# Patient Record
Sex: FEMALE | Race: OTHER | HISPANIC OR LATINO
[De-identification: names, ages, dates, MRNs, and addresses within clinical notes are randomized per-mention and may not be internally consistent; named-entity substitution may affect disease eponyms.]

---

## 2018-12-12 ENCOUNTER — ASOB RESULT (OUTPATIENT)
Age: 28
End: 2018-12-12

## 2018-12-12 ENCOUNTER — APPOINTMENT (OUTPATIENT)
Dept: ANTEPARTUM | Facility: CLINIC | Age: 28
End: 2018-12-12
Payer: MEDICAID

## 2018-12-12 PROBLEM — Z00.00 ENCOUNTER FOR PREVENTIVE HEALTH EXAMINATION: Status: ACTIVE | Noted: 2018-12-12

## 2018-12-12 PROCEDURE — 76801 OB US < 14 WKS SINGLE FETUS: CPT

## 2019-02-19 ENCOUNTER — ASOB RESULT (OUTPATIENT)
Age: 29
End: 2019-02-19

## 2019-02-19 ENCOUNTER — APPOINTMENT (OUTPATIENT)
Dept: ANTEPARTUM | Facility: CLINIC | Age: 29
End: 2019-02-19
Payer: MEDICAID

## 2019-02-19 PROCEDURE — 76817 TRANSVAGINAL US OBSTETRIC: CPT

## 2019-02-19 PROCEDURE — 76811 OB US DETAILED SNGL FETUS: CPT

## 2019-04-09 ENCOUNTER — ASOB RESULT (OUTPATIENT)
Age: 29
End: 2019-04-09

## 2019-04-09 ENCOUNTER — APPOINTMENT (OUTPATIENT)
Age: 29
End: 2019-04-09
Payer: MEDICAID

## 2019-04-09 PROCEDURE — 76816 OB US FOLLOW-UP PER FETUS: CPT

## 2019-05-21 ENCOUNTER — APPOINTMENT (OUTPATIENT)
Dept: ANTEPARTUM | Facility: CLINIC | Age: 29
End: 2019-05-21
Payer: MEDICAID

## 2019-05-21 ENCOUNTER — ASOB RESULT (OUTPATIENT)
Age: 29
End: 2019-05-21

## 2019-05-21 PROCEDURE — 76819 FETAL BIOPHYS PROFIL W/O NST: CPT

## 2019-05-21 PROCEDURE — 76816 OB US FOLLOW-UP PER FETUS: CPT

## 2019-06-06 ENCOUNTER — APPOINTMENT (OUTPATIENT)
Dept: MATERNAL FETAL MEDICINE | Facility: CLINIC | Age: 29
End: 2019-06-06

## 2019-06-10 PROBLEM — Z86.39 HISTORY OF HYPOTHYROIDISM: Status: RESOLVED | Noted: 2019-06-10 | Resolved: 2019-06-10

## 2019-06-10 PROBLEM — O99.280 HYPOTHYROID IN PREGNANCY, ANTEPARTUM: Status: ACTIVE | Noted: 2019-06-10

## 2019-06-10 PROBLEM — Z82.49 FAMILY HISTORY OF CARDIOMYOPATHY: Status: ACTIVE | Noted: 2019-06-10

## 2019-06-10 PROBLEM — O99.210 OBESITY IN PREGNANCY: Status: ACTIVE | Noted: 2019-06-10

## 2019-06-10 PROBLEM — Z82.0 FAMILY HISTORY OF ALZHEIMER'S DISEASE: Status: ACTIVE | Noted: 2019-06-10

## 2019-06-10 PROBLEM — Z82.49 FAMILY HISTORY OF HYPERTENSION: Status: ACTIVE | Noted: 2019-06-10

## 2019-06-10 PROBLEM — Z82.3 FAMILY HISTORY OF CEREBROVASCULAR ACCIDENT (CVA): Status: ACTIVE | Noted: 2019-06-10

## 2019-06-10 RX ORDER — MULTIVIT-MIN/FOLIC/VIT K/LYCOP 400-300MCG
28-0.8 TABLET ORAL DAILY
Refills: 0 | Status: ACTIVE | COMMUNITY
Start: 2019-06-10

## 2019-06-10 RX ORDER — LEVOTHYROXINE SODIUM 0.03 MG/1
25 TABLET ORAL DAILY
Refills: 0 | Status: ACTIVE | COMMUNITY
Start: 2019-06-10

## 2019-06-11 ENCOUNTER — APPOINTMENT (OUTPATIENT)
Dept: MATERNAL FETAL MEDICINE | Facility: CLINIC | Age: 29
End: 2019-06-11

## 2019-06-11 ENCOUNTER — APPOINTMENT (OUTPATIENT)
Dept: ANTEPARTUM | Facility: CLINIC | Age: 29
End: 2019-06-11

## 2019-06-11 DIAGNOSIS — O99.280 ENDOCRINE, NUTRITIONAL AND METABOLIC DISEASES COMPLICATING PREGNANCY, UNSPECIFIED TRIMESTER: ICD-10-CM

## 2019-06-11 DIAGNOSIS — Z82.49 FAMILY HISTORY OF ISCHEMIC HEART DISEASE AND OTHER DISEASES OF THE CIRCULATORY SYSTEM: ICD-10-CM

## 2019-06-11 DIAGNOSIS — Z82.0 FAMILY HISTORY OF EPILEPSY AND OTHER DISEASES OF THE NERVOUS SYSTEM: ICD-10-CM

## 2019-06-11 DIAGNOSIS — Z82.3 FAMILY HISTORY OF STROKE: ICD-10-CM

## 2019-06-11 DIAGNOSIS — E03.9 ENDOCRINE, NUTRITIONAL AND METABOLIC DISEASES COMPLICATING PREGNANCY, UNSPECIFIED TRIMESTER: ICD-10-CM

## 2019-06-11 DIAGNOSIS — O99.210 OBESITY COMPLICATING PREGNANCY, UNSPECIFIED TRIMESTER: ICD-10-CM

## 2019-06-11 DIAGNOSIS — Z86.39 PERSONAL HISTORY OF OTHER ENDOCRINE, NUTRITIONAL AND METABOLIC DISEASE: ICD-10-CM

## 2019-06-14 ENCOUNTER — APPOINTMENT (OUTPATIENT)
Dept: ANTEPARTUM | Facility: CLINIC | Age: 29
End: 2019-06-14
Payer: MEDICAID

## 2019-06-14 ENCOUNTER — ASOB RESULT (OUTPATIENT)
Age: 29
End: 2019-06-14

## 2019-06-14 PROCEDURE — 76816 OB US FOLLOW-UP PER FETUS: CPT

## 2019-06-14 PROCEDURE — 76819 FETAL BIOPHYS PROFIL W/O NST: CPT

## 2019-12-27 ENCOUNTER — EMERGENCY (EMERGENCY)
Facility: HOSPITAL | Age: 29
LOS: 1 days | Discharge: HOME | End: 2019-12-27
Attending: STUDENT IN AN ORGANIZED HEALTH CARE EDUCATION/TRAINING PROGRAM
Payer: SELF-PAY

## 2019-12-27 VITALS
TEMPERATURE: 99 F | SYSTOLIC BLOOD PRESSURE: 147 MMHG | OXYGEN SATURATION: 97 % | HEART RATE: 90 BPM | DIASTOLIC BLOOD PRESSURE: 84 MMHG | RESPIRATION RATE: 18 BRPM

## 2019-12-27 VITALS — WEIGHT: 210.1 LBS | HEIGHT: 64 IN

## 2019-12-27 LAB
ALBUMIN SERPL ELPH-MCNC: 3.8 G/DL — SIGNIFICANT CHANGE UP (ref 3.3–5.2)
ALP SERPL-CCNC: 52 U/L — SIGNIFICANT CHANGE UP (ref 40–120)
ALT FLD-CCNC: 8 U/L — SIGNIFICANT CHANGE UP
ANION GAP SERPL CALC-SCNC: 13 MMOL/L — SIGNIFICANT CHANGE UP (ref 5–17)
APPEARANCE UR: ABNORMAL
AST SERPL-CCNC: 16 U/L — SIGNIFICANT CHANGE UP
BACTERIA # UR AUTO: ABNORMAL
BASOPHILS # BLD AUTO: 0.05 K/UL — SIGNIFICANT CHANGE UP (ref 0–0.2)
BASOPHILS NFR BLD AUTO: 0.4 % — SIGNIFICANT CHANGE UP (ref 0–2)
BILIRUB SERPL-MCNC: 0.2 MG/DL — LOW (ref 0.4–2)
BILIRUB UR-MCNC: NEGATIVE — SIGNIFICANT CHANGE UP
BLD GP AB SCN SERPL QL: SIGNIFICANT CHANGE UP
BUN SERPL-MCNC: 7 MG/DL — LOW (ref 8–20)
CALCIUM SERPL-MCNC: 8.9 MG/DL — SIGNIFICANT CHANGE UP (ref 8.6–10.2)
CHLORIDE SERPL-SCNC: 103 MMOL/L — SIGNIFICANT CHANGE UP (ref 98–107)
CO2 SERPL-SCNC: 20 MMOL/L — LOW (ref 22–29)
COLOR SPEC: YELLOW — SIGNIFICANT CHANGE UP
CREAT SERPL-MCNC: 0.44 MG/DL — LOW (ref 0.5–1.3)
DIFF PNL FLD: ABNORMAL
EOSINOPHIL # BLD AUTO: 0.12 K/UL — SIGNIFICANT CHANGE UP (ref 0–0.5)
EOSINOPHIL NFR BLD AUTO: 0.9 % — SIGNIFICANT CHANGE UP (ref 0–6)
EPI CELLS # UR: ABNORMAL
GLUCOSE SERPL-MCNC: 86 MG/DL — SIGNIFICANT CHANGE UP (ref 70–115)
GLUCOSE UR QL: NEGATIVE MG/DL — SIGNIFICANT CHANGE UP
HCG SERPL-ACNC: 8670 MIU/ML — HIGH
HCT VFR BLD CALC: 42.7 % — SIGNIFICANT CHANGE UP (ref 34.5–45)
HGB BLD-MCNC: 13.3 G/DL — SIGNIFICANT CHANGE UP (ref 11.5–15.5)
IMM GRANULOCYTES NFR BLD AUTO: 0.4 % — SIGNIFICANT CHANGE UP (ref 0–1.5)
KETONES UR-MCNC: NEGATIVE — SIGNIFICANT CHANGE UP
LEUKOCYTE ESTERASE UR-ACNC: ABNORMAL
LYMPHOCYTES # BLD AUTO: 2.89 K/UL — SIGNIFICANT CHANGE UP (ref 1–3.3)
LYMPHOCYTES # BLD AUTO: 22.8 % — SIGNIFICANT CHANGE UP (ref 13–44)
MCHC RBC-ENTMCNC: 27.9 PG — SIGNIFICANT CHANGE UP (ref 27–34)
MCHC RBC-ENTMCNC: 31.1 GM/DL — LOW (ref 32–36)
MCV RBC AUTO: 89.5 FL — SIGNIFICANT CHANGE UP (ref 80–100)
MONOCYTES # BLD AUTO: 0.65 K/UL — SIGNIFICANT CHANGE UP (ref 0–0.9)
MONOCYTES NFR BLD AUTO: 5.1 % — SIGNIFICANT CHANGE UP (ref 2–14)
NEUTROPHILS # BLD AUTO: 8.91 K/UL — HIGH (ref 1.8–7.4)
NEUTROPHILS NFR BLD AUTO: 70.4 % — SIGNIFICANT CHANGE UP (ref 43–77)
NITRITE UR-MCNC: NEGATIVE — SIGNIFICANT CHANGE UP
PH UR: 8 — SIGNIFICANT CHANGE UP (ref 5–8)
PLATELET # BLD AUTO: 221 K/UL — SIGNIFICANT CHANGE UP (ref 150–400)
POTASSIUM SERPL-MCNC: 4.6 MMOL/L — SIGNIFICANT CHANGE UP (ref 3.5–5.3)
POTASSIUM SERPL-SCNC: 4.6 MMOL/L — SIGNIFICANT CHANGE UP (ref 3.5–5.3)
PROT SERPL-MCNC: 7.3 G/DL — SIGNIFICANT CHANGE UP (ref 6.6–8.7)
PROT UR-MCNC: 30 MG/DL
RBC # BLD: 4.77 M/UL — SIGNIFICANT CHANGE UP (ref 3.8–5.2)
RBC # FLD: 12.7 % — SIGNIFICANT CHANGE UP (ref 10.3–14.5)
RBC CASTS # UR COMP ASSIST: ABNORMAL /HPF (ref 0–4)
SODIUM SERPL-SCNC: 136 MMOL/L — SIGNIFICANT CHANGE UP (ref 135–145)
SP GR SPEC: 1.01 — SIGNIFICANT CHANGE UP (ref 1.01–1.02)
UROBILINOGEN FLD QL: NEGATIVE MG/DL — SIGNIFICANT CHANGE UP
WBC # BLD: 12.67 K/UL — HIGH (ref 3.8–10.5)
WBC # FLD AUTO: 12.67 K/UL — HIGH (ref 3.8–10.5)
WBC UR QL: SIGNIFICANT CHANGE UP

## 2019-12-27 PROCEDURE — 84702 CHORIONIC GONADOTROPIN TEST: CPT

## 2019-12-27 PROCEDURE — 36415 COLL VENOUS BLD VENIPUNCTURE: CPT

## 2019-12-27 PROCEDURE — 76817 TRANSVAGINAL US OBSTETRIC: CPT

## 2019-12-27 PROCEDURE — 99284 EMERGENCY DEPT VISIT MOD MDM: CPT

## 2019-12-27 PROCEDURE — 86901 BLOOD TYPING SEROLOGIC RH(D): CPT

## 2019-12-27 PROCEDURE — 76801 OB US < 14 WKS SINGLE FETUS: CPT

## 2019-12-27 PROCEDURE — 86900 BLOOD TYPING SEROLOGIC ABO: CPT

## 2019-12-27 PROCEDURE — 76817 TRANSVAGINAL US OBSTETRIC: CPT | Mod: 26

## 2019-12-27 PROCEDURE — 76801 OB US < 14 WKS SINGLE FETUS: CPT | Mod: 26

## 2019-12-27 PROCEDURE — 87086 URINE CULTURE/COLONY COUNT: CPT

## 2019-12-27 PROCEDURE — 81001 URINALYSIS AUTO W/SCOPE: CPT

## 2019-12-27 PROCEDURE — 86850 RBC ANTIBODY SCREEN: CPT

## 2019-12-27 PROCEDURE — 85027 COMPLETE CBC AUTOMATED: CPT

## 2019-12-27 PROCEDURE — 99284 EMERGENCY DEPT VISIT MOD MDM: CPT | Mod: 25

## 2019-12-27 PROCEDURE — 80053 COMPREHEN METABOLIC PANEL: CPT

## 2019-12-27 RX ORDER — ACETAMINOPHEN 500 MG
975 TABLET ORAL ONCE
Refills: 0 | Status: COMPLETED | OUTPATIENT
Start: 2019-12-27 | End: 2019-12-27

## 2019-12-27 RX ADMIN — Medication 975 MILLIGRAM(S): at 13:48

## 2019-12-27 NOTE — ED PROVIDER NOTE - ATTENDING CONTRIBUTION TO CARE
I personally saw the patient with the PA, and completed the key components of the history and physical exam. I then discussed the management plan with the PA.    pt  with vag bleeding and cramping no confirmed IUP for this preg, VSS, benign exam, r/o anemia and ectopic preg vs threatened ab vs ab - labs, tvus

## 2019-12-27 NOTE — ED PROVIDER NOTE - PATIENT PORTAL LINK FT
You can access the FollowMyHealth Patient Portal offered by [facility] by registering at the following website: [email or web address]. By joining [facility]’s FollowMyHealth portal, you will also be able to view your health information using other applications (apps) compatible with our system.

## 2019-12-27 NOTE — ED PROVIDER NOTE - PROGRESS NOTE DETAILS
care signed out to BRISEIDA Macias Results discussed with PT using . Strict ED return precautions discussed. PT does not have a OB/GYN. PT advised to return to ED in 48 hours for repeat HCG.

## 2019-12-27 NOTE — ED PROVIDER NOTE - NS ED ROS FT
No fever/chills, No photophobia/eye pain/changes in vision, No ear pain/sore throat/dysphagia, No chest pain/palpitations, no SOB/cough/wheeze/stridor, No abdominal pain, No N/V/D, +vaginal bleeding, no dysuria/frequency/discharge, No neck/back pain, no rash, no changes in neurological status/function.

## 2019-12-27 NOTE — ED PROVIDER NOTE - OBJECTIVE STATEMENT
This is a 29 year old female with c/o vaginal bleeding x pelvic cramping x 1 day.  She reports is approximately 13 weeks pregnant.  She notes this is her second pregnancy.  First pregnancy alive and well.  She notes has follow up with [facility] clinic this week.  Admits has not had diagnosed IUP, or confirmatory pregnancy blood work.  She notes upon wiping to use restroom today noticed blood and she immediately laid in bed and elevated her feet.  She notes lower pelvic cramping.  She admits bleeding and cramping have subsided today.  She denies any other complaints fevers, chills, body aches, n/v/d or any sick contacts, recent travel or rashes.

## 2019-12-27 NOTE — ED PROVIDER NOTE - NSFOLLOWUPINSTRUCTIONS_ED_ALL_ED_FT
Threatened Miscarriage    A threatened miscarriage is the term for vaginal bleeding during your first 20 weeks of pregnancy but the pregnancy has not ended. If you have vaginal bleeding during this time, your health care provider will do tests to check if you are still pregnant. If the tests show you are still pregnant and the developing baby (fetus) inside your womb (uterus) is still growing, your condition is considered a threatened miscarriage. A threatened miscarriage does not mean your pregnancy will end, but it does increase the risk of losing your pregnancy. It is important to have close follow up with your obstetrician.    SEEK IMMEDIATE MEDICAL CARE IF YOU HAVE ANY OF THE FOLLOWING SYMPTOMS: heavy vaginal bleeding, severe low back or abdominal cramps, fever/chills, or lightheadedness/dizziness.     Please follow up in the Emergency Department in 48 hours for repeat lab  work.

## 2019-12-28 ENCOUNTER — EMERGENCY (EMERGENCY)
Facility: HOSPITAL | Age: 29
LOS: 1 days | Discharge: HOME | End: 2019-12-28
Attending: EMERGENCY MEDICINE
Payer: SELF-PAY

## 2019-12-28 VITALS
RESPIRATION RATE: 20 BRPM | OXYGEN SATURATION: 99 % | HEIGHT: 64 IN | WEIGHT: 210.1 LBS | DIASTOLIC BLOOD PRESSURE: 69 MMHG | SYSTOLIC BLOOD PRESSURE: 107 MMHG | TEMPERATURE: 98 F | HEART RATE: 87 BPM

## 2019-12-28 LAB
BASOPHILS # BLD AUTO: 0.03 K/UL — SIGNIFICANT CHANGE UP (ref 0–0.2)
BASOPHILS NFR BLD AUTO: 0.3 % — SIGNIFICANT CHANGE UP (ref 0–2)
CULTURE RESULTS: NO GROWTH — SIGNIFICANT CHANGE UP
EOSINOPHIL # BLD AUTO: 0.11 K/UL — SIGNIFICANT CHANGE UP (ref 0–0.5)
EOSINOPHIL NFR BLD AUTO: 1 % — SIGNIFICANT CHANGE UP (ref 0–6)
HCG SERPL-ACNC: 5647 MIU/ML — HIGH
HCT VFR BLD CALC: 41.3 % — SIGNIFICANT CHANGE UP (ref 34.5–45)
HGB BLD-MCNC: 12.9 G/DL — SIGNIFICANT CHANGE UP (ref 11.5–15.5)
IMM GRANULOCYTES NFR BLD AUTO: 0.3 % — SIGNIFICANT CHANGE UP (ref 0–1.5)
LYMPHOCYTES # BLD AUTO: 2.79 K/UL — SIGNIFICANT CHANGE UP (ref 1–3.3)
LYMPHOCYTES # BLD AUTO: 25.7 % — SIGNIFICANT CHANGE UP (ref 13–44)
MCHC RBC-ENTMCNC: 28.5 PG — SIGNIFICANT CHANGE UP (ref 27–34)
MCHC RBC-ENTMCNC: 31.2 GM/DL — LOW (ref 32–36)
MCV RBC AUTO: 91.2 FL — SIGNIFICANT CHANGE UP (ref 80–100)
MONOCYTES # BLD AUTO: 0.55 K/UL — SIGNIFICANT CHANGE UP (ref 0–0.9)
MONOCYTES NFR BLD AUTO: 5.1 % — SIGNIFICANT CHANGE UP (ref 2–14)
NEUTROPHILS # BLD AUTO: 7.36 K/UL — SIGNIFICANT CHANGE UP (ref 1.8–7.4)
NEUTROPHILS NFR BLD AUTO: 67.6 % — SIGNIFICANT CHANGE UP (ref 43–77)
PLATELET # BLD AUTO: 239 K/UL — SIGNIFICANT CHANGE UP (ref 150–400)
RBC # BLD: 4.53 M/UL — SIGNIFICANT CHANGE UP (ref 3.8–5.2)
RBC # FLD: 13 % — SIGNIFICANT CHANGE UP (ref 10.3–14.5)
SPECIMEN SOURCE: SIGNIFICANT CHANGE UP
WBC # BLD: 10.87 K/UL — HIGH (ref 3.8–10.5)
WBC # FLD AUTO: 10.87 K/UL — HIGH (ref 3.8–10.5)

## 2019-12-28 PROCEDURE — 76817 TRANSVAGINAL US OBSTETRIC: CPT | Mod: 26

## 2019-12-28 PROCEDURE — 76801 OB US < 14 WKS SINGLE FETUS: CPT

## 2019-12-28 PROCEDURE — 84702 CHORIONIC GONADOTROPIN TEST: CPT

## 2019-12-28 PROCEDURE — 99284 EMERGENCY DEPT VISIT MOD MDM: CPT

## 2019-12-28 PROCEDURE — 36415 COLL VENOUS BLD VENIPUNCTURE: CPT

## 2019-12-28 PROCEDURE — 85027 COMPLETE CBC AUTOMATED: CPT

## 2019-12-28 PROCEDURE — 99284 EMERGENCY DEPT VISIT MOD MDM: CPT | Mod: 25

## 2019-12-28 PROCEDURE — 76801 OB US < 14 WKS SINGLE FETUS: CPT | Mod: 26

## 2019-12-28 PROCEDURE — 76817 TRANSVAGINAL US OBSTETRIC: CPT

## 2019-12-28 PROCEDURE — T1013: CPT

## 2019-12-28 PROCEDURE — 99053 MED SERV 10PM-8AM 24 HR FAC: CPT

## 2019-12-28 RX ORDER — MISOPROSTOL 200 UG/1
4 TABLET ORAL
Qty: 4 | Refills: 0
Start: 2019-12-28 | End: 2019-12-28

## 2019-12-28 RX ORDER — IBUPROFEN 200 MG
1 TABLET ORAL
Qty: 28 | Refills: 0
Start: 2019-12-28 | End: 2020-01-03

## 2019-12-28 NOTE — ED PROVIDER NOTE - ATTENDING CONTRIBUTION TO CARE
seen with acp: well appearing young female with lower pelvic pain and cramping, was seen yesterday dx with 13 week pregnant; on exam, NAD, no icterus, normal heart and lung sounds, abd soft  with mild pelvic ttp; all results and consults reviewed, agree with acp plan of care

## 2019-12-28 NOTE — CONSULT NOTE ADULT - SUBJECTIVE AND OBJECTIVE BOX
A 30yo  LMP 19 presenting with vaginal bleeding. Patient was seen in the ED yesterday with vaginal bleeding and pelvic cramping. Her HCG was 8670 and she had a subchorionic hematoma with gestational sac. She returns today after passing clots and feeling dizzy at that time. Currently she denies dizziness, abdominal pain, N/V or fevers.     PAST MEDICAL & SURGICAL HISTORY: tonsillectomy, hypothyroidism     Allergies: No Known Allergies    POB/GYN Hx: NSVDx1    Vital Signs:  Vital Signs Last 24 Hrs  T(C): 36.9 (28 Dec 2019 06:42), Max: 36.9 (28 Dec 2019 06:42)  T(F): 98.5 (28 Dec 2019 06:42), Max: 98.5 (28 Dec 2019 06:42)  HR: 87 (28 Dec 2019 06:42) (87 - 87)  BP: 107/69 (28 Dec 2019 06:42) (107/69 - 107/69)  RR: 20 (28 Dec 2019 06:42) (20 - 20)  SpO2: 99% (28 Dec 2019 06:42) (99% - 99%)    Physical Exam:  General: NAD  Abdomen: soft, NT  Pelvic: Normal external genitalia, no lesions in vaginal canal, blood clots in vaginal vault, no lesions on cervix, os is closed, no active bleeding from cervical os  Ext: No cyanosis, edema or calf tenderness    Labs:                        13.3   12.67 )-----------( 221      ( 27 Dec 2019 12:52 )             42.7       136  |  103  |  7.0<L>  ----------------------------<  86  4.6   |  20.0<L>  |  0.44<L>    Ca    8.9      27 Dec 2019 12:52    TPro  7.3  /  Alb  3.8  /  TBili  0.2<L>  /  DBili  x   /  AST  16  /  ALT  8   /  AlkPhos  52      HCG Quantitative, Serum: 5647.0 mIU/mL (19 @ 07:50)  HCG Quantitative, Serum: 8670.0 mIU/mL (19 @ 12:52)    Radiology:  < from: US OB <=14 Weeks, First Gestation (.28.19 @ 11:05) >  INTERPRETATION:  CLINICAL INFORMATION: Vaginal bleeding for 2 days. Decreasing beta hCG today (5647), previously 8670 on 2019.    LMP: 2019.    Estimated Gestational Age by LMP: 13 weeks, 1 day.    COMPARISON: 2019.    Endovaginal and transabdominal pelvic sonogram. Color and Spectral Doppler was performed.    FINDINGS:    Uterus: 14.2 x 5.1 x 6.6 cm. Previously seen uterine gestational sac is no longer visualized. Endometrial stripe is thickened and heterogeneous, measuring 1.4 cm. The endocervical canal is distended with heterogeneous material withoutinternal vascularity.    Right ovary: 3.4 x 2.6 x 2.1 cm. Within normal limits. Flow is present.    Left ovary: 3.1 x 2.6 x 3.0 cm. Corpus luteum, measuring 1.8 x 1.6 x 1.7 cm. Flow is present.    Fluid: None.    IMPRESSION:     in progress.    < end of copied text >

## 2019-12-28 NOTE — ED ADULT TRIAGE NOTE - CHIEF COMPLAINT QUOTE
I was here yesterday for vag bleeding they told me if I  continues to bleed to come back I am bleeding a lot. I am 13 weeks preg

## 2019-12-28 NOTE — ED PROVIDER NOTE - PATIENT PORTAL LINK FT
You can access the FollowMyHealth Patient Portal offered by [facility] by registering at the following website: [email or web address]. By joining [facility]’s FollowMyHealth portal, you will also be able to view your health information using other applications (apps) compatible with our system.

## 2019-12-28 NOTE — ED PROVIDER NOTE - NS ED ROS FT
No fever/chills, No photophobia/eye pain/changes in vision, No ear pain/sore throat/dysphagia, No chest pain/palpitations, no SOB/cough/wheeze/stridor, No abdominal pain, No N/V/D, +vaginal bleeding with clots, no dysuria/frequency/discharge, No neck/back pain, no rash, no changes in neurological status/function.

## 2019-12-28 NOTE — CONSULT NOTE ADULT - ATTENDING COMMENTS
Multip with spontaneous ab, endo lining less than 2cm on sono, no active bleeding on speculum exam, hemodynamically stable.     -cytotec 800mcg   -repeat sono in one week  -strong precautions provided  -questions and concerns were answered  patient understands and agrees with plan  ppalos

## 2019-12-28 NOTE — CONSULT NOTE ADULT - ASSESSMENT
A 28yo  LMP 19 presenting with spontaneous .    Patient is hemodynamically stable with no active bleeding.  Repeat US today showing no IUP.   Will d/c home with cytotec and ibuprofen is h/h stable.  Discussed plan with BRISEIDA Spaulding.    D/W Dr. Yadav

## 2019-12-28 NOTE — ED PROVIDER NOTE - PROGRESS NOTE DETAILS
patient declined pelvic exam, prefers to wait for GYN assessement, likely miscarriage, will rpt beta hcg GYN contacted requested rpt CBC and US US + in progress, patient placed in A13 pending GYN evaluation

## 2019-12-28 NOTE — ED ADULT NURSE NOTE - NS ED NURSE LEVEL OF CONSCIOUSNESS ORIENTATION
Oriented - self; Oriented - place; Oriented - time Banner Transposition Flap Text: The defect edges were debeveled with a #15 scalpel blade.  Given the location of the defect and the proximity to free margins a Banner transposition flap was deemed most appropriate.  Using a sterile surgical marker, an appropriate flap drawn around the defect. The area thus outlined was incised deep to adipose tissue with a #15 scalpel blade.  The skin margins were undermined to an appropriate distance in all directions utilizing iris scissors.

## 2019-12-28 NOTE — ED PROVIDER NOTE - OBJECTIVE STATEMENT
This is a 29 year old female with c/o vaginal bleeding x pelvic cramping x 1 day.  She reports is approximately 13 weeks pregnant.  She notes this is her second pregnancy.  First pregnancy alive and well.  She notes has follow up with [facility] clinic this week.  Admits has not had diagnosed IUP, or confirmatory pregnancy blood work.  She notes upon wiping to use restroom today noticed blood and she immediately laid in bed and elevated her feet.  She notes lower pelvic cramping.  She admits bleeding and cramping have subsided today.  She denies any other complaints fevers, chills, body aches, n/v/d or any sick contacts, recent travel or rashes.

## 2020-06-08 PROBLEM — Z00.00 ENCOUNTER FOR PREVENTIVE HEALTH EXAMINATION: Status: ACTIVE | Noted: 2020-06-08

## 2020-06-16 ENCOUNTER — ASOB RESULT (OUTPATIENT)
Age: 30
End: 2020-06-16

## 2020-06-16 ENCOUNTER — APPOINTMENT (OUTPATIENT)
Dept: ANTEPARTUM | Facility: CLINIC | Age: 30
End: 2020-06-16
Payer: MEDICAID

## 2020-06-16 PROCEDURE — 76805 OB US >/= 14 WKS SNGL FETUS: CPT

## 2020-09-23 ENCOUNTER — APPOINTMENT (OUTPATIENT)
Dept: ANTEPARTUM | Facility: CLINIC | Age: 30
End: 2020-09-23

## 2020-09-28 ENCOUNTER — ASOB RESULT (OUTPATIENT)
Age: 30
End: 2020-09-28

## 2020-09-28 ENCOUNTER — APPOINTMENT (OUTPATIENT)
Dept: ANTEPARTUM | Facility: CLINIC | Age: 30
End: 2020-09-28
Payer: MEDICAID

## 2020-09-28 PROCEDURE — 76816 OB US FOLLOW-UP PER FETUS: CPT

## 2020-10-09 ENCOUNTER — OUTPATIENT (OUTPATIENT)
Dept: INPATIENT UNIT | Facility: HOSPITAL | Age: 30
LOS: 1 days | End: 2020-10-09
Payer: COMMERCIAL

## 2020-10-09 VITALS — HEART RATE: 81 BPM | SYSTOLIC BLOOD PRESSURE: 117 MMHG | TEMPERATURE: 98 F | DIASTOLIC BLOOD PRESSURE: 71 MMHG

## 2020-10-09 VITALS — SYSTOLIC BLOOD PRESSURE: 126 MMHG | HEART RATE: 86 BPM | DIASTOLIC BLOOD PRESSURE: 76 MMHG

## 2020-10-09 DIAGNOSIS — O47.03 FALSE LABOR BEFORE 37 COMPLETED WEEKS OF GESTATION, THIRD TRIMESTER: ICD-10-CM

## 2020-10-09 NOTE — OB PROVIDER TRIAGE NOTE - NSHPPHYSICALEXAM_GEN_ALL_CORE
Vital Signs Last 24 Hrs  T(C): 37.3 (09 Oct 2020 21:07), Max: 37.3 (09 Oct 2020 21:07)  T(F): 99.1 (09 Oct 2020 21:07), Max: 99.1 (09 Oct 2020 21:07)  HR: 81 (09 Oct 2020 22:33) (81 - 86)  BP: 117/71 (09 Oct 2020 22:33) (117/71 - 126/76)  RR: 18 (09 Oct 2020 21:07) (18 - 18)    Gen: NAD  Cardio: RRR  Lungs: CTAB   Abdomen: gravid, nontender to palpation  Ext: nontender lower extremities bilaterally   SSE: no gross pooling, NEG NITRAZINE, amnisure pending   SVE: 0/0/-3  Bedside sono: vertex, fundo-posterior placenta, MVP 4.5cm    FHR: baseline 140, moderate variability, +accels, no decels  [place]: irritability

## 2020-10-09 NOTE — OB PROVIDER TRIAGE NOTE - NSOBPROVIDERNOTE_OBGYN_ALL_OB_FT
31 y/o  at 37w0d evaluated for SROM and labor.   - speculum exam unremarkable, no fluid, nitrazine neg, amnisure neg   - bedside sono with appropriate fluid, MVP 4.5cm   - likely not ruptured   - cervical exam unremarkable, unchanged from yesterday, likely not in labor   - FHT Cat I, reactive   - [place] with minimal irritability     Patient will be discharged home with labor and rupture precautions.   Recommended adequate hydration at home.   Gave instructions for when to return to care earlier than expected.   Will follow up in clinic next week.     d/w Dr. Mendez

## 2020-10-09 NOTE — OB PROVIDER TRIAGE NOTE - HISTORY OF PRESENT ILLNESS
29 y/o  at 37w0d presenting after passing mucoid discharge at noon, has not recurred. Denies vaginal bleeding. Reports intermittent contractions for the last 3d, was checked in the clinic yesterday and was closed. +sexual intercourse within the last 48hrs, +vaginal exam within the last 24hrs. +FM  Prenatal course otherwise uncomplicated.     ObGynHx:   G1: 2019, FT, , 7lbs, uncomplicated   GynHx: denies history of abnormal pap smears, fibroids, endometriosis, or ovarian cysts; denies history of STD's   Meds: PNV  Allergies: NKDA   SocHx: denies use of EtOH, illicit trugs, or tobacco during this pregnancy or prior; denies any hx of anxiety or depression; feels safe at home

## 2020-10-22 ENCOUNTER — OUTPATIENT (OUTPATIENT)
Dept: INPATIENT UNIT | Facility: HOSPITAL | Age: 30
LOS: 1 days | End: 2020-10-22
Payer: COMMERCIAL

## 2020-10-22 VITALS
HEART RATE: 85 BPM | DIASTOLIC BLOOD PRESSURE: 74 MMHG | TEMPERATURE: 98 F | RESPIRATION RATE: 18 BRPM | SYSTOLIC BLOOD PRESSURE: 117 MMHG

## 2020-10-22 VITALS
RESPIRATION RATE: 17 BRPM | TEMPERATURE: 98 F | SYSTOLIC BLOOD PRESSURE: 114 MMHG | DIASTOLIC BLOOD PRESSURE: 70 MMHG | HEART RATE: 96 BPM

## 2020-10-22 DIAGNOSIS — Z90.89 ACQUIRED ABSENCE OF OTHER ORGANS: Chronic | ICD-10-CM

## 2020-10-22 DIAGNOSIS — O47.1 FALSE LABOR AT OR AFTER 37 COMPLETED WEEKS OF GESTATION: ICD-10-CM

## 2020-10-22 LAB
ALBUMIN SERPL ELPH-MCNC: 3.1 G/DL — LOW (ref 3.3–5.2)
ALP SERPL-CCNC: 119 U/L — SIGNIFICANT CHANGE UP (ref 40–120)
ALT FLD-CCNC: 6 U/L — SIGNIFICANT CHANGE UP
AMYLASE P1 CFR SERPL: 66 U/L — SIGNIFICANT CHANGE UP (ref 36–128)
ANION GAP SERPL CALC-SCNC: 13 MMOL/L — SIGNIFICANT CHANGE UP (ref 5–17)
APPEARANCE UR: CLEAR — SIGNIFICANT CHANGE UP
AST SERPL-CCNC: 14 U/L — SIGNIFICANT CHANGE UP
BACTERIA # UR AUTO: NEGATIVE — SIGNIFICANT CHANGE UP
BILIRUB SERPL-MCNC: <0.2 MG/DL — LOW (ref 0.4–2)
BILIRUB UR-MCNC: NEGATIVE — SIGNIFICANT CHANGE UP
BUN SERPL-MCNC: 7 MG/DL — LOW (ref 8–20)
CALCIUM SERPL-MCNC: 8.8 MG/DL — SIGNIFICANT CHANGE UP (ref 8.6–10.2)
CHLORIDE SERPL-SCNC: 104 MMOL/L — SIGNIFICANT CHANGE UP (ref 98–107)
CO2 SERPL-SCNC: 19 MMOL/L — LOW (ref 22–29)
COLOR SPEC: YELLOW — SIGNIFICANT CHANGE UP
CREAT SERPL-MCNC: 0.41 MG/DL — LOW (ref 0.5–1.3)
DIFF PNL FLD: NEGATIVE — SIGNIFICANT CHANGE UP
EPI CELLS # UR: SIGNIFICANT CHANGE UP
GLUCOSE SERPL-MCNC: 120 MG/DL — HIGH (ref 70–99)
GLUCOSE UR QL: NEGATIVE MG/DL — SIGNIFICANT CHANGE UP
HCT VFR BLD CALC: 35 % — SIGNIFICANT CHANGE UP (ref 34.5–45)
HGB BLD-MCNC: 11.5 G/DL — SIGNIFICANT CHANGE UP (ref 11.5–15.5)
KETONES UR-MCNC: NEGATIVE — SIGNIFICANT CHANGE UP
LEUKOCYTE ESTERASE UR-ACNC: ABNORMAL
LIDOCAIN IGE QN: 33 U/L — SIGNIFICANT CHANGE UP (ref 22–51)
MCHC RBC-ENTMCNC: 30.2 PG — SIGNIFICANT CHANGE UP (ref 27–34)
MCHC RBC-ENTMCNC: 32.9 GM/DL — SIGNIFICANT CHANGE UP (ref 32–36)
MCV RBC AUTO: 91.9 FL — SIGNIFICANT CHANGE UP (ref 80–100)
NITRITE UR-MCNC: NEGATIVE — SIGNIFICANT CHANGE UP
PH UR: 6 — SIGNIFICANT CHANGE UP (ref 5–8)
PLATELET # BLD AUTO: 126 K/UL — LOW (ref 150–400)
POTASSIUM SERPL-MCNC: 3.9 MMOL/L — SIGNIFICANT CHANGE UP (ref 3.5–5.3)
POTASSIUM SERPL-SCNC: 3.9 MMOL/L — SIGNIFICANT CHANGE UP (ref 3.5–5.3)
PROT SERPL-MCNC: 5.9 G/DL — LOW (ref 6.6–8.7)
PROT UR-MCNC: 15 MG/DL
RBC # BLD: 3.81 M/UL — SIGNIFICANT CHANGE UP (ref 3.8–5.2)
RBC # FLD: 13.4 % — SIGNIFICANT CHANGE UP (ref 10.3–14.5)
RBC CASTS # UR COMP ASSIST: NEGATIVE /HPF — SIGNIFICANT CHANGE UP (ref 0–4)
SODIUM SERPL-SCNC: 136 MMOL/L — SIGNIFICANT CHANGE UP (ref 135–145)
SP GR SPEC: 1.02 — SIGNIFICANT CHANGE UP (ref 1.01–1.02)
UROBILINOGEN FLD QL: NEGATIVE MG/DL — SIGNIFICANT CHANGE UP
WBC # BLD: 9.05 K/UL — SIGNIFICANT CHANGE UP (ref 3.8–10.5)
WBC # FLD AUTO: 9.05 K/UL — SIGNIFICANT CHANGE UP (ref 3.8–10.5)
WBC UR QL: SIGNIFICANT CHANGE UP

## 2020-10-22 PROCEDURE — 82150 ASSAY OF AMYLASE: CPT

## 2020-10-22 PROCEDURE — 83986 ASSAY PH BODY FLUID NOS: CPT

## 2020-10-22 PROCEDURE — 36415 COLL VENOUS BLD VENIPUNCTURE: CPT

## 2020-10-22 PROCEDURE — 81001 URINALYSIS AUTO W/SCOPE: CPT

## 2020-10-22 PROCEDURE — 59025 FETAL NON-STRESS TEST: CPT

## 2020-10-22 PROCEDURE — 85027 COMPLETE CBC AUTOMATED: CPT

## 2020-10-22 PROCEDURE — 83690 ASSAY OF LIPASE: CPT

## 2020-10-22 PROCEDURE — G0463: CPT

## 2020-10-22 PROCEDURE — 80053 COMPREHEN METABOLIC PANEL: CPT

## 2020-10-22 PROCEDURE — 76815 OB US LIMITED FETUS(S): CPT

## 2020-10-22 RX ORDER — SODIUM CHLORIDE 9 MG/ML
1000 INJECTION, SOLUTION INTRAVENOUS ONCE
Refills: 0 | Status: DISCONTINUED | OUTPATIENT
Start: 2020-10-22 | End: 2020-11-06

## 2020-10-22 NOTE — OB RN TRIAGE NOTE - PMH
No pertinent past medical history     <<----- Click to add NO pertinent Past Medical History Miscarriage  x1  Normal delivery at term  2019

## 2020-10-22 NOTE — OB PROVIDER TRIAGE NOTE - HISTORY OF PRESENT ILLNESS
ANTHONY WOOTEN is a 29 year old  at 38 weeks GA who was sent by the clinic 2/2 decreased fetal movement since 2am this morning and nausea/vomiting. She states that on last cervical exam in the clinic she was dilated by 1cm. She reports nausea/vomiting for the past 4-5 days unrelated to food intake. This morning she had three episodes of nonbilious yellow vomit with streaks of blood. She also complains of a headache, white spots in her vision, chest pressure and numbness in both of her hands. She denies diarrhea, constipation, fever and dysuria.  Prenatal course otherwise uncomplicated.    FM: decreased  LOF: no  VB: no Cx: irregular every 3-4 hours    SHAHID: 2020  LMP: 2020      OBHx: , IOL in 2019 (female infant, 6lbs), Chlamydia in previous pregnancy (treated)  PMHx: hypothyroidism (no treatment required)  SGHx: tonsillectomy   Rx: PNV  All: NKA  SOHx: no smoking, alcohol and recreational drug use    Vital Signs Last 24 Hrs:  T(C): 36.9 (22 Oct 2020 13:44), Max: 36.9 (22 Oct 2020 13:44)  T(F): 98.4 (22 Oct 2020 13:44), Max: 98.4 (22 Oct 2020 13:44)  HR: 77 (22 Oct 2020 15:31) (77 - 85)  BP: 108/76 (22 Oct 2020 15:31) (108/76 - 117/74)  RR: 18 (22 Oct 2020 13:44) (18 - 18)    Physical Exam:  Gen: NAD, alert and oriented  Cardio: RRR  Lungs: CTAB   Abdomen: gravid, nontender to palpation  Ext: nontender lower extremities bilaterally   Bedside sono: vertex, fundal placenta, BPP 8/8, LISETTE 11.95    FHR: baseline 145, moderate variability, +accels, no decels  [place]: irregular contractions       ANTHONY WOOTEN is a 29 year old  at 38 weeks GA who was sent by the clinic 2/2 decreased fetal movement since 2am this morning and nausea/vomiting.  She states that on last cervical exam in the clinic she was dilated by 1cm.  She reports nausea/vomiting for the past 4-5 days unrelated to food intake. This morning she had three episodes of nonbilious yellow vomit with streaks of blood. She also complains of a headache, white spots in her vision, chest pressure and numbness in both of her hands.    Denies vaginal bleeding or loss of fluid. Reports mild contractions, every 3-4 hours  She denies fevers, chills, shortness of breath, dysuria, frequency, urgency, changes in bowel movements.     SHAHID: 2020  LMP: 2020    Prenatal course otherwise uncomplicated.    OBHx:  G1 SAB   G2 , IOL in 2019 (female infant, 6lbs), Chlamydia in previous pregnancy (treated)  PMHx: subclinical hypothyroidism   SGHx: tonsillectomy   Rx: PNV  All: NKDA  SOHx: no smoking, alcohol and recreational drug use    Vital Signs Last 24 Hrs:  T(C): 36.9 (22 Oct 2020 13:44), Max: 36.9 (22 Oct 2020 13:44)  T(F): 98.4 (22 Oct 2020 13:44), Max: 98.4 (22 Oct 2020 13:44)  HR: 77 (22 Oct 2020 15:31) (77 - 85)  BP: 108/76 (22 Oct 2020 15:31) (108/76 - 117/74)  RR: 18 (22 Oct 2020 13:44) (18 - 18)    Physical Exam:  Gen: NAD, alert and orientedx3  Cardio: RRR  Lungs: CTAB   Abdomen: gravid, nontender to palpation, no guarding or rebound; negative crawford sign  Ext: nontender lower extremities bilaterally; +2 pulses, no swelling  Bedside sono: vertex, fundal placenta, BPP 8/8, LISETTE 11.95    FHR: baseline 145, moderate variability, +accels, no decels  [place]: irregular contractions  SVE: deferred

## 2020-10-22 NOTE — OB PROVIDER TRIAGE NOTE - NSOBPROVIDERNOTE_OBGYN_ALL_OB_FT
ANTHONY WOOTEN is a 29 year old  at 38 weeks GA who was sent by the clinic 2/2 decreased fetal movement  and nausea/vomiting since 2am this morning.    Plan:    Problem #1: decreased fetal movement  - bedside sono with appropriate fluid, BPP 8/8, fetal movement noted   - reactive FHT  - no other obstetrical complains  - patient advised to hydrate adequately at home  - counseled on     Problem #2: nausea/vomiting  - CBC, CMP, UA and lipase WNL  -     - IVF for dehydration  - ANTHONY WOOTEN is a 29 year old  at 38 weeks GA who was sent by the clinic 2/2 decreased fetal movement and nausea/vomiting since 2am this morning.    Plan:    Problem #1: decreased fetal movement  - bedside sono with appropriate fluid, BPP 8/8, fetal movement noted   - reactive FHT  - no other obstetrical complains  - patient advised to hydrate adequately at home  - counseled on signs and symptoms meriting call to OB provider or return to the hospital    Problem #2: nausea/vomiting  - CBC significant for mild thrombocytopenia  - CMP, UA and lipase WNL  - IVF for dehydration    Problem #3: gestational thrombocytopenia   - platelet count of 126  - to be monitored weekly  - ANTHONY WOOTEN is a 29 year old  at 38 weeks GA who was sent by the clinic 2/2 decreased fetal movement and nausea/vomiting since 2am this morning.    Plan:    Problem #1: decreased fetal movement  - bedside sono with appropriate fluid, BPP 8/8, fetal movement noted   - reactive FHT  - no other obstetrical complains  - patient advised to hydrate adequately at home  - counseled on signs and symptoms meriting call to OB provider or return to the hospital    Problem #2: nausea/vomiting  - CBC significant for mild thrombocytopenia  - CMP, UA and lipase WNL  - IVF for dehydration    Problem #3: gestational thrombocytopenia   - platelet count of 126  - no further evaluation necessary, currently no risk for the mother or fetus  - most likely to resolve postpartum  - to be monitored weekly ANTHONY WOOTEN is a 29 year old  at 38 weeks GA who was sent by the clinic 2/2 decreased fetal movement and nausea/vomiting     Plan:    decreased fetal movement  - bedside u/s LISETTE 11.97, BPP 8/8  - FHT reactive; attending reviewed and approved  - no other obstetrical complains  - s/p LR 1000 bolus  - patient advised to hydrate adequately at home      #nausea/vomiting  - gastritis vs urinary infection  - CBC, CMP, UA and lipase WNL  - s/p LR bolus for dehydration  - able to tolerate PO  - not requiring inpatient management      #gestational thrombocytopenia   - platelet count of 126  - possibly secondary to hemodilution  - no further evaluation necessary, currently no risk for the mother or fetus  - most likely to resolve postpartum  - to be monitored weekly; has follow up appointment in clinic    Dispo:   stable for discharge home  counseled on signs and symptoms meriting call to OB provider or return to the hospital    Discussed with Dr Mendez

## 2020-11-01 ENCOUNTER — INPATIENT (INPATIENT)
Facility: HOSPITAL | Age: 30
LOS: 2 days | Discharge: HOME | End: 2020-11-04
Attending: OBSTETRICS & GYNECOLOGY | Admitting: OBSTETRICS & GYNECOLOGY
Payer: COMMERCIAL

## 2020-11-01 VITALS
DIASTOLIC BLOOD PRESSURE: 70 MMHG | RESPIRATION RATE: 18 BRPM | HEART RATE: 85 BPM | TEMPERATURE: 98 F | SYSTOLIC BLOOD PRESSURE: 113 MMHG

## 2020-11-01 DIAGNOSIS — Z90.89 ACQUIRED ABSENCE OF OTHER ORGANS: Chronic | ICD-10-CM

## 2020-11-01 DIAGNOSIS — O26.893 OTHER SPECIFIED PREGNANCY RELATED CONDITIONS, THIRD TRIMESTER: ICD-10-CM

## 2020-11-01 DIAGNOSIS — O47.1 FALSE LABOR AT OR AFTER 37 COMPLETED WEEKS OF GESTATION: ICD-10-CM

## 2020-11-01 LAB
BASOPHILS # BLD AUTO: 0.03 K/UL — SIGNIFICANT CHANGE UP (ref 0–0.2)
BASOPHILS NFR BLD AUTO: 0.3 % — SIGNIFICANT CHANGE UP (ref 0–2)
BLD GP AB SCN SERPL QL: SIGNIFICANT CHANGE UP
EOSINOPHIL # BLD AUTO: 0.07 K/UL — SIGNIFICANT CHANGE UP (ref 0–0.5)
EOSINOPHIL NFR BLD AUTO: 0.7 % — SIGNIFICANT CHANGE UP (ref 0–6)
HCT VFR BLD CALC: 35.1 % — SIGNIFICANT CHANGE UP (ref 34.5–45)
HGB BLD-MCNC: 11.4 G/DL — LOW (ref 11.5–15.5)
IMM GRANULOCYTES NFR BLD AUTO: 1.2 % — SIGNIFICANT CHANGE UP (ref 0–1.5)
LYMPHOCYTES # BLD AUTO: 2 K/UL — SIGNIFICANT CHANGE UP (ref 1–3.3)
LYMPHOCYTES # BLD AUTO: 21.3 % — SIGNIFICANT CHANGE UP (ref 13–44)
MCHC RBC-ENTMCNC: 29.6 PG — SIGNIFICANT CHANGE UP (ref 27–34)
MCHC RBC-ENTMCNC: 32.5 GM/DL — SIGNIFICANT CHANGE UP (ref 32–36)
MCV RBC AUTO: 91.2 FL — SIGNIFICANT CHANGE UP (ref 80–100)
MONOCYTES # BLD AUTO: 0.75 K/UL — SIGNIFICANT CHANGE UP (ref 0–0.9)
MONOCYTES NFR BLD AUTO: 8 % — SIGNIFICANT CHANGE UP (ref 2–14)
NEUTROPHILS # BLD AUTO: 6.41 K/UL — SIGNIFICANT CHANGE UP (ref 1.8–7.4)
NEUTROPHILS NFR BLD AUTO: 68.5 % — SIGNIFICANT CHANGE UP (ref 43–77)
PLATELET # BLD AUTO: 123 K/UL — LOW (ref 150–400)
RBC # BLD: 3.85 M/UL — SIGNIFICANT CHANGE UP (ref 3.8–5.2)
RBC # FLD: 13.4 % — SIGNIFICANT CHANGE UP (ref 10.3–14.5)
SARS-COV-2 RNA SPEC QL NAA+PROBE: SIGNIFICANT CHANGE UP
WBC # BLD: 9.37 K/UL — SIGNIFICANT CHANGE UP (ref 3.8–10.5)
WBC # FLD AUTO: 9.37 K/UL — SIGNIFICANT CHANGE UP (ref 3.8–10.5)

## 2020-11-01 RX ORDER — CITRIC ACID/SODIUM CITRATE 300-500 MG
30 SOLUTION, ORAL ORAL ONCE
Refills: 0 | Status: COMPLETED | OUTPATIENT
Start: 2020-11-01 | End: 2020-11-02

## 2020-11-01 RX ORDER — ACETAMINOPHEN 500 MG
975 TABLET ORAL ONCE
Refills: 0 | Status: COMPLETED | OUTPATIENT
Start: 2020-11-01 | End: 2020-11-01

## 2020-11-01 RX ORDER — SODIUM CHLORIDE 9 MG/ML
1000 INJECTION, SOLUTION INTRAVENOUS
Refills: 0 | Status: DISCONTINUED | OUTPATIENT
Start: 2020-11-01 | End: 2020-11-02

## 2020-11-01 RX ORDER — OXYTOCIN 10 UNIT/ML
VIAL (ML) INJECTION
Qty: 20 | Refills: 0 | Status: COMPLETED | OUTPATIENT
Start: 2020-11-01 | End: 2020-11-01

## 2020-11-01 RX ORDER — SODIUM CHLORIDE 9 MG/ML
1000 INJECTION, SOLUTION INTRAVENOUS ONCE
Refills: 0 | Status: DISCONTINUED | OUTPATIENT
Start: 2020-11-01 | End: 2020-11-01

## 2020-11-01 RX ADMIN — Medication 975 MILLIGRAM(S): at 17:23

## 2020-11-01 RX ADMIN — Medication 1 TABLET(S): at 21:24

## 2020-11-01 RX ADMIN — Medication 1 TABLET(S): at 20:24

## 2020-11-01 RX ADMIN — Medication 975 MILLIGRAM(S): at 16:23

## 2020-11-01 RX ADMIN — SODIUM CHLORIDE 125 MILLILITER(S): 9 INJECTION, SOLUTION INTRAVENOUS at 13:00

## 2020-11-01 NOTE — OB PROVIDER H&P - HISTORY OF PRESENT ILLNESS
ANTHONY WOOTEN is a 29 year old  at 39w 6d presenting with a headache since 11pm that did not improve after a single dose of 325mg of Tylenol. Denies vaginal bleeding, leakage of fluid or uterine contractions. Reports active fetal movement.     SHAHID: 2020  LMP: 2020    Prenatal course otherwise uncomplicated.    OBHx:  G1 SAB   G2 , IOL in 2019 (female infant, 6lbs), Chlamydia in previous pregnancy (treated)  PMHx: subclinical hypothyroidism (euthyroid after testing without medications)   SGHx: tonsillectomy   Rx: PNV  All: NKDA

## 2020-11-01 NOTE — OB PROVIDER H&P - ATTENDING COMMENTS
29 year old  at 39w 6d admitted for IOL for NRFHT at term   denies VB , LOF. good FM  GBS neg   hx of GBS in prev pregnancy   euthyroid , not on meds  EFW 7lbs   plan start IOL   DVT ppx

## 2020-11-01 NOTE — OB PROVIDER H&P - ASSESSMENT
ANTHONY WOOTEN is a 29 year old  at 39w 6d admitted for IOL for NRFHT    - Consent at bedside  - IV Fluids  - GBS negative   - Rh positive  - Cytotec for IOL   - Non-reactive FHT without uterine contractions

## 2020-11-01 NOTE — OB PROVIDER H&P - ALERT: PERTINENT HISTORY
1st Trimester Sonogram/Follow up Sonogram for Growth/Fetal Non-Stress Test (NST)/Ultra Screen at 12 Weeks

## 2020-11-01 NOTE — OB PROVIDER H&P - NSHPPHYSICALEXAM_GEN_ALL_CORE
Vital Signs Last 24 Hrs  T(C): 36.8 (01 Nov 2020 12:47), Max: 36.8 (01 Nov 2020 11:49)  T(F): 98.2 (01 Nov 2020 12:47), Max: 98.2 (01 Nov 2020 11:49)  HR: 85 (01 Nov 2020 12:47) (85 - 85)  BP: 113/70 (01 Nov 2020 12:47) (113/70 - 113/70)  RR: 18 (01 Nov 2020 12:47) (18 - 18)    General: NAD   Abdomen: soft, NT, gravid uterus  SVE: 1/50/-3, intact, vertex     Bedside: BPP 6/8 (no fetal breathing); LISETTE 14.6 cm, vertex, posterior placenta, active fetal movement

## 2020-11-02 ENCOUNTER — TRANSCRIPTION ENCOUNTER (OUTPATIENT)
Age: 30
End: 2020-11-02

## 2020-11-02 LAB
SARS-COV-2 IGG SERPL QL IA: POSITIVE
SARS-COV-2 IGM SERPL IA-ACNC: 110 INDEX — HIGH

## 2020-11-02 PROCEDURE — G0463: CPT

## 2020-11-02 PROCEDURE — 84112 EVAL AMNIOTIC FLUID PROTEIN: CPT

## 2020-11-02 PROCEDURE — 59050 FETAL MONITOR W/REPORT: CPT

## 2020-11-02 PROCEDURE — 83986 ASSAY PH BODY FLUID NOS: CPT

## 2020-11-02 PROCEDURE — 59025 FETAL NON-STRESS TEST: CPT

## 2020-11-02 RX ORDER — KETOROLAC TROMETHAMINE 30 MG/ML
30 SYRINGE (ML) INJECTION ONCE
Refills: 0 | Status: DISCONTINUED | OUTPATIENT
Start: 2020-11-02 | End: 2020-11-02

## 2020-11-02 RX ORDER — OXYCODONE HYDROCHLORIDE 5 MG/1
5 TABLET ORAL ONCE
Refills: 0 | Status: DISCONTINUED | OUTPATIENT
Start: 2020-11-02 | End: 2020-11-04

## 2020-11-02 RX ORDER — IBUPROFEN 200 MG
600 TABLET ORAL EVERY 6 HOURS
Refills: 0 | Status: COMPLETED | OUTPATIENT
Start: 2020-11-02 | End: 2021-10-01

## 2020-11-02 RX ORDER — IBUPROFEN 200 MG
1 TABLET ORAL
Qty: 28 | Refills: 0
Start: 2020-11-02 | End: 2020-11-08

## 2020-11-02 RX ORDER — ACETAMINOPHEN 500 MG
975 TABLET ORAL
Refills: 0 | Status: DISCONTINUED | OUTPATIENT
Start: 2020-11-02 | End: 2020-11-04

## 2020-11-02 RX ORDER — OXYTOCIN 10 UNIT/ML
2 VIAL (ML) INJECTION
Qty: 30 | Refills: 0 | Status: DISCONTINUED | OUTPATIENT
Start: 2020-11-02 | End: 2020-11-02

## 2020-11-02 RX ORDER — IBUPROFEN 200 MG
600 TABLET ORAL EVERY 6 HOURS
Refills: 0 | Status: DISCONTINUED | OUTPATIENT
Start: 2020-11-02 | End: 2020-11-04

## 2020-11-02 RX ORDER — OXYCODONE HYDROCHLORIDE 5 MG/1
5 TABLET ORAL
Refills: 0 | Status: DISCONTINUED | OUTPATIENT
Start: 2020-11-02 | End: 2020-11-04

## 2020-11-02 RX ADMIN — Medication 2 MILLIUNIT(S)/MIN: at 08:55

## 2020-11-02 RX ADMIN — Medication 975 MILLIGRAM(S): at 22:05

## 2020-11-02 RX ADMIN — Medication 975 MILLIGRAM(S): at 20:37

## 2020-11-02 RX ADMIN — Medication 30 MILLILITER(S): at 06:02

## 2020-11-02 RX ADMIN — Medication 1000 MILLIUNIT(S)/MIN: at 12:26

## 2020-11-02 RX ADMIN — Medication 30 MILLIGRAM(S): at 13:14

## 2020-11-02 RX ADMIN — Medication 30 MILLIGRAM(S): at 13:29

## 2020-11-02 RX ADMIN — Medication 600 MILLIGRAM(S): at 18:06

## 2020-11-02 RX ADMIN — Medication 600 MILLIGRAM(S): at 23:32

## 2020-11-02 RX ADMIN — Medication 600 MILLIGRAM(S): at 18:25

## 2020-11-02 NOTE — DISCHARGE NOTE OB - PLAN OF CARE
Rapid recovery Patient should transition to regular activity level. Resume regular diet. Patient should follow up with her OB for a postpartum checkup 3 weeks after delivery. Patient should call her doctor sooner if she develops a fever or uncontrolled vaginal bleeding or fevers. Please call sooner if there are any other concerns.

## 2020-11-02 NOTE — DISCHARGE NOTE OB - HOSPITAL COURSE
She is a 31 yo now  who presented at 40wks GA for induction of labor and had a normal delivery. She had a normal postpartum course and was discharged home in stable condition on postpartum day 1.  hard copy, drawn during this pregnancy

## 2020-11-02 NOTE — OB PROVIDER DELIVERY SUMMARY - NSPROVIDERDELIVERYNOTE_OBGYN_ALL_OB_FT
Patient felt rectal pressure and was found to be fully dilated, 0 station. She pushed effectively for 20 minutes, and delivered a viable male infant at 1226. Vertex delivered without difficulty, no nuchal cord noted, compound presentation was noted with right hand at perneum, both shoulders then delivered without difficulty.  Delayed cord clamping for approximately 30 seconds, and skin to skin was initiated. Cord segment was clamped and cut for cord blood and gas collection. Placenta delivered spontaneously and intact at 1232. Pitocin started. Uterus, cervix, perineum and vagina were inspected and no lacerations were noted. Apgars 9,9. EBL 100cc. Patient felt rectal pressure and was found to be fully dilated, 0 station. She pushed effectively for 20 minutes, and delivered a viable male infant at 1226. Vertex delivered without difficulty, no nuchal cord noted, compound presentation was noted with right hand at perineum, both shoulders then delivered without difficulty.  Delayed cord clamping for approximately 30 seconds, and skin to skin was initiated. Cord segment was clamped and cut for cord blood and gas collection. Placenta delivered spontaneously and intact at 1232. Pitocin started. Uterus, cervix, perineum and vagina were inspected and no lacerations were noted. Apgars 9,9. EBL 100cc.

## 2020-11-02 NOTE — DISCHARGE NOTE OB - CARE PLAN
Principal Discharge DX:	Normal delivery at term  Goal:	Rapid recovery  Assessment and plan of treatment:	Patient should transition to regular activity level. Resume regular diet. Patient should follow up with her OB for a postpartum checkup 3 weeks after delivery. Patient should call her doctor sooner if she develops a fever or uncontrolled vaginal bleeding or fevers. Please call sooner if there are any other concerns.

## 2020-11-02 NOTE — DISCHARGE NOTE OB - CARE PROVIDER_API CALL
[facility],   [address] [address]  Phone: [phone]  Fax: (   )    -  Follow Up Time:

## 2020-11-02 NOTE — OB RN DELIVERY SUMMARY - NS_SEPSISRSKCALC_OBGYN_ALL_OB_FT
GBS status in the 'Prenatal Lab tests/results section' on the OB RN Patient Profile must be documented.   EOS calculated successfully. EOS Risk Factor: 0.5/1000 live births ([facility] national incidence); GA=40w;Temp=98.6; ROM=5.1; GBS='Negative'; Antibiotics='No antibiotics or any antibiotics < 2 hrs prior to birth'

## 2020-11-02 NOTE — DISCHARGE NOTE OB - MEDICATION SUMMARY - MEDICATIONS TO TAKE
I will START or STAY ON the medications listed below when I get home from the hospital:    ibuprofen 600 mg oral tablet  -- 1 tab(s) by mouth every 6 hours   -- Do not take this drug if you are pregnant.  It is very important that you take or use this exactly as directed.  Do not skip doses or discontinue unless directed by your doctor.  May cause drowsiness or dizziness.  Obtain medical advice before taking any non-prescription drugs as some may affect the action of this medication.  Take with food or milk.    -- Indication: For pain

## 2020-11-02 NOTE — OB PROVIDER LABOR PROGRESS NOTE - ASSESSMENT
30 y.o.  at 40 weeks gestation undergoing IOL for NRFHT, s/p cytotec. Cat 1 FHT. AROM with clear fluid.     - continue expectant management  - if contractions space out, consider pitocin  - continuous toco and fetal heart monitoring      Discussed with Dr. Mendez.
-continues with headache with migraine-like picture; refractory to tylenol 975  -to receive one dose of fioricet   -continues to be normotensive  -FHR Cat I  -continue cytotec, due for first dose of 40 at 20:00  -contractions not requesting pain management at this time  -continue to monitor
FHT cat 1. Not feeling contractions and not yifan regularly. Headache improved. Continue cytotec IOL
-progressing in labor  -counseled on risks/benefits/timing of epidural; at this time, patient would prefer to wait for epidural at later time; not requesting other forms of pain management  -Cat I tracing  -continue cytotec; due for 2nd dose of 60 at approx 0330am    discussed with Dr Hoff
30 y.o.  at 40 weeks undergoing IOL for NRFHT on pitocin, s/p cytotec. Cat 1 FHT. AROM with clear fluid.    -continue induction with pitocin  -continuous toco and fetal heart monitoring  -allow for passive descent before pushing    Discussed with Dr. Mendez
Patient making good cervical change. Will get epidural. T cat 1. Will change to pitocin after.     discussed with Dr. Hoff

## 2020-11-02 NOTE — DISCHARGE NOTE OB - MEDICATION SUMMARY - MEDICATIONS TO STOP TAKING
I will STOP taking the medications listed below when I get home from the hospital:    Cytotec 200 mcg oral tablet  -- 4 tab(s) by mouth once   -- Do not take this drug if you are pregnant.  It is very important that you take or use this exactly as directed.  Do not skip doses or discontinue unless directed by your doctor.  Take with food or milk.

## 2020-11-02 NOTE — OB PROVIDER LABOR PROGRESS NOTE - NS_OBIHIFHRDETAILS_OBGYN_ALL_OB_FT
140/mod elena/+accels/ no decels - periods of minimal variability c/w sleep cycles
baseline 145, moderate variability, no accels, no decels
130 baseline, moderate variability, +accels, -decels
140/mod elena/+accels
baseline 135, moderate variability, +accels, no decels
140 baseline/moderate variability/ +accels/-decels

## 2020-11-02 NOTE — DISCHARGE NOTE OB - MATERIALS PROVIDED
[facility] Screening Program/Breastfeeding Log/Guide to Postpartum Care/[facility] Hearing Screen Program/Back To Sleep Handout/Shaken Baby Prevention Handout/Birth Certificate Instructions/Tdap Vaccination (VIS Pub Date: 2012)

## 2020-11-02 NOTE — DISCHARGE NOTE OB - PATIENT PORTAL LINK FT
You can access the FollowMyHealth Patient Portal offered by [facility] by registering at the following website: [email or web address]. By joining [employer]’s FollowMyHealth portal, you will also be able to view your health information using other applications (apps) compatible with our system.

## 2020-11-02 NOTE — DISCHARGE NOTE OB - PROVIDER TOKENS
FREE:[LAST:[[facility] clini],PHONE:[phone]],FAX:[phone]    -],ADDRESS:[[address] [place] [place] [address]]]

## 2020-11-02 NOTE — OB PROVIDER LABOR PROGRESS NOTE - NS_SUBJECTIVE/OBJECTIVE_OBGYN_ALL_OB_FT
IOL with oral cytotec ( s/p 2nd dose 40 mcg)  for NRFHT. Patient s/p one dose of fioricet from migraine.
Patient feeling more pain and would like an epidural.
Patient is feeling some rectal and vaginal pressure.
Patient re-evaluated because requesting epidural  Rates pain 7/10 when having contractions
Patient re-evaluated due to persistent headache  Rates pain 8/10 now with new blurry vision and photophobia  Denies nausea, vomiting, shortness of breath, RUQ or epigastric pain    Feeling mild pelvic discomfort with contractions
Patient is feeling comfortable with epidural in place.     AROM with clear fluid.

## 2020-11-03 LAB
T PALLIDUM AB TITR SER: NEGATIVE — SIGNIFICANT CHANGE UP

## 2020-11-03 RX ORDER — INFLUENZA VIRUS VACCINE 15; 15; 15; 15 UG/.5ML; UG/.5ML; UG/.5ML; UG/.5ML
0.5 SUSPENSION INTRAMUSCULAR ONCE
Refills: 0 | Status: COMPLETED | OUTPATIENT
Start: 2020-11-03 | End: 2020-11-04

## 2020-11-03 RX ADMIN — Medication 600 MILLIGRAM(S): at 18:00

## 2020-11-03 RX ADMIN — Medication 600 MILLIGRAM(S): at 12:15

## 2020-11-03 RX ADMIN — Medication 975 MILLIGRAM(S): at 20:19

## 2020-11-03 RX ADMIN — Medication 600 MILLIGRAM(S): at 06:02

## 2020-11-03 RX ADMIN — Medication 600 MILLIGRAM(S): at 11:19

## 2020-11-03 RX ADMIN — Medication 600 MILLIGRAM(S): at 06:30

## 2020-11-03 RX ADMIN — Medication 975 MILLIGRAM(S): at 15:50

## 2020-11-03 RX ADMIN — Medication 975 MILLIGRAM(S): at 21:19

## 2020-11-03 RX ADMIN — Medication 975 MILLIGRAM(S): at 05:00

## 2020-11-03 RX ADMIN — Medication 600 MILLIGRAM(S): at 01:16

## 2020-11-03 RX ADMIN — Medication 975 MILLIGRAM(S): at 03:55

## 2020-11-03 RX ADMIN — Medication 975 MILLIGRAM(S): at 14:55

## 2020-11-03 RX ADMIN — Medication 600 MILLIGRAM(S): at 17:09

## 2020-11-03 NOTE — PROGRESS NOTE ADULT - ATTENDING COMMENTS
Post  day # 1  no complaints  exam wnl    Plan  cbc  other routine care  discussed contraception, vaccination, breastfeeding  cleared for dc if cbc normal  postpartum visit in 4 to 6 w

## 2020-11-03 NOTE — PROGRESS NOTE ADULT - ASSESSMENT
30y F, now , postpartum day 1 after  at 40w, IOL for NRFHT.  Pt progressing well, plan for discharge home today. 30y F, now , postpartum day 1 after  at 40w, IOL for NRFHT.  Pt progressing well, plan for discharge home today.     30y F, now , postpartum day 1 after  at 40w, IOL for NRFHT.    - Pt progressing well with uncomplicated postpartum course  - Advance care as tolerated  - Continue postpartum education  - Baby boy Vidya+

## 2020-11-03 NOTE — PROGRESS NOTE ADULT - SUBJECTIVE AND OBJECTIVE BOX
Pt doing well, denies any complaints, eating, toileting without difficulty, no acute events overnight, VSS. 30y F, now , postpartum day 1 after  at 40w.  Pt doing well, denies any complaints, eating, toileting without difficulty, no acute events overnight, VSS.    Vital Signs Last 24 Hrs  T(C): 36.4 (2020 04:02), Max: 37.1 (2020 21:20)  T(F): 97.5 (2020 04:02), Max: 98.8 (2020 21:20)  HR: 77 (2020 04:02) (68 - 106)  BP: 109/65 (2020 04:02) (96/53 - 143/84)  BP(mean): --  RR: 17 (2020 04:02) (17 - 18)  SpO2: 98% (2020 04:02) (76% - 100%)    PE:  Constitutional: Awake, alert, in no acute distress  Eyes: no scleral icterus  HENT: normocephalic, atraumatic, moist oral mucosa  CV: RRR  Pulm: non-labored respirations  Abdomen: soft, non-tender, non-distended  Extremities: no edema, no deformity  Neuro: AAOx3, moving all extremities equally 30y F, now , postpartum day 1 after  at 40w.  Pt doing well, denies any complaints, eating, toileting without difficulty, no acute events overnight, VSS.    Vital Signs Last 24 Hrs  T(C): 36.4 (2020 04:02), Max: 37.1 (2020 21:20)  T(F): 97.5 (2020 04:02), Max: 98.8 (2020 21:20)  HR: 77 (2020 04:02) (68 - 106)  BP: 109/65 (2020 04:02) (96/53 - 143/84)  BP(mean): --  RR: 17 (2020 04:02) (17 - 18)  SpO2: 98% (2020 04:02) (76% - 100%)    PE:  Constitutional: Awake, alert, in no acute distress  Eyes: no scleral icterus  HENT: normocephalic, atraumatic, moist oral mucosa  CV: RRR  Pulm: non-labored respirations  Abdomen: soft, non-tender, non-distended  Uterus: fundus palpable below umbilicus  VE: +lochia  Extremities: no edema, no deformity     30y F, now , postpartum day 1 after  at 40w.  Pt doing well, denies any complaints, ambulating, eating, toileting without difficulty, no acute events overnight.    Vital Signs Last 24 Hrs  T(C): 36.4 (2020 04:02), Max: 37.1 (2020 21:20)  T(F): 97.5 (2020 04:02), Max: 98.8 (2020 21:20)  HR: 77 (2020 04:02) (68 - 106)  BP: 109/65 (2020 04:02) (96/53 - 143/84)  BP(mean): --  RR: 17 (2020 04:02) (17 - 18)  SpO2: 98% (2020 04:02) (76% - 100%)    PE:  Constitutional: Awake, alert, in no acute distress  HENT: normocephalic, atraumatic, moist oral mucosa  CV: RRR  Pulm: non-labored respirations  Abdomen: soft, non-tender, non-distended  Uterus: fundus firm below umbilicus  VE: +lochia  Extremities: no edema, no deformity

## 2020-11-04 VITALS
SYSTOLIC BLOOD PRESSURE: 118 MMHG | OXYGEN SATURATION: 100 % | DIASTOLIC BLOOD PRESSURE: 70 MMHG | HEART RATE: 75 BPM | RESPIRATION RATE: 18 BRPM | TEMPERATURE: 98 F

## 2020-11-04 PROCEDURE — 59025 FETAL NON-STRESS TEST: CPT

## 2020-11-04 PROCEDURE — 90686 IIV4 VACC NO PRSV 0.5 ML IM: CPT

## 2020-11-04 PROCEDURE — 86901 BLOOD TYPING SEROLOGIC RH(D): CPT

## 2020-11-04 PROCEDURE — 85025 COMPLETE CBC W/AUTO DIFF WBC: CPT

## 2020-11-04 PROCEDURE — G0463: CPT

## 2020-11-04 PROCEDURE — 86780 TREPONEMA PALLIDUM: CPT

## 2020-11-04 PROCEDURE — U0003: CPT

## 2020-11-04 PROCEDURE — 36415 COLL VENOUS BLD VENIPUNCTURE: CPT

## 2020-11-04 PROCEDURE — 59050 FETAL MONITOR W/REPORT: CPT

## 2020-11-04 PROCEDURE — T1013: CPT

## 2020-11-04 PROCEDURE — 86900 BLOOD TYPING SEROLOGIC ABO: CPT

## 2020-11-04 PROCEDURE — 86850 RBC ANTIBODY SCREEN: CPT

## 2020-11-04 PROCEDURE — 86769 SARS-COV-2 COVID-19 ANTIBODY: CPT

## 2020-11-04 RX ADMIN — Medication 600 MILLIGRAM(S): at 12:53

## 2020-11-04 RX ADMIN — Medication 975 MILLIGRAM(S): at 10:00

## 2020-11-04 RX ADMIN — Medication 975 MILLIGRAM(S): at 04:35

## 2020-11-04 RX ADMIN — Medication 600 MILLIGRAM(S): at 06:05

## 2020-11-04 RX ADMIN — Medication 600 MILLIGRAM(S): at 05:07

## 2020-11-04 RX ADMIN — Medication 600 MILLIGRAM(S): at 18:10

## 2020-11-04 RX ADMIN — Medication 600 MILLIGRAM(S): at 17:39

## 2020-11-04 RX ADMIN — INFLUENZA VIRUS VACCINE 0.5 MILLILITER(S): 15; 15; 15; 15 SUSPENSION INTRAMUSCULAR at 05:07

## 2020-11-04 RX ADMIN — Medication 975 MILLIGRAM(S): at 09:27

## 2020-11-04 RX ADMIN — Medication 600 MILLIGRAM(S): at 13:33

## 2020-11-04 RX ADMIN — Medication 975 MILLIGRAM(S): at 03:35

## 2020-11-04 NOTE — PROGRESS NOTE ADULT - SUBJECTIVE AND OBJECTIVE BOX
30y F, now , postpartum day 2 after  at 40w.  Pt doing well, denies any complaints, ambulating, eating, toileting without difficulty, no acute events overnight.  Baby boy being treated with phototherapy after positive Vidya test.    Vital Signs Last 24 Hrs  T(C): 36.6 (2020 04:12), Max: 36.6 (2020 15:28)  T(F): 97.9 (2020 04:12), Max: 97.9 (2020 15:28)  HR: 75 (2020 04:12) (74 - 75)  BP: 110/77 (2020 04:12) (110/67 - 110/77)  BP(mean): --  RR: 18 (2020 04:12) (18 - 18)  SpO2: 97% (2020 04:12) (97% - 100%)    Constitutional: Awake, alert, in no acute distress  HENT: normocephalic, atraumatic, moist oral mucosa  CV: RRR  Pulm: non-labored respirations  Abdomen: soft, non-tender, firm fundus below umbilicus  VE: +lochia  Extremities: no edema, no deformity  Skin: no rash, no jaundice  Neuro: AAOx3, moving all extremities equally 30y F, now , postpartum day 2 after  at 40w.  Pt doing well, denies any complaints, ambulating, eating, toileting without difficulty, no acute events overnight.  Baby boy being treated with phototherapy after positive Vidya test.    Vital Signs Last 24 Hrs  T(C): 36.6 (2020 04:12), Max: 36.6 (2020 15:28)  T(F): 97.9 (2020 04:12), Max: 97.9 (2020 15:28)  HR: 75 (2020 04:12) (74 - 75)  BP: 110/77 (2020 04:12) (110/67 - 110/77)  RR: 18 (2020 04:12) (18 - 18)  SpO2: 97% (2020 04:12) (97% - 100%)    Constitutional: Awake, alert, in no acute distress  HENT: normocephalic, atraumatic, moist oral mucosa  CV: RRR  Pulm: non-labored respirations  Abdomen: soft, non-tender, firm fundus below umbilicus  VE: +lochia  Extremities: no edema, no deformity  Skin: no rash, no jaundice  Neuro: AAOx3, moving all extremities equally    Complete Blood Count + Automated Diff (20 @ 13:22)    WBC Count: 9.37 K/uL    RBC Count: 3.85 M/uL    Hemoglobin: 11.4 g/dL    Hematocrit: 35.1 %    Mean Cell Volume: 91.2 fl    Mean Cell Hemoglobin: 29.6 pg    Mean Cell Hemoglobin Conc: 32.5 gm/dL    Red Cell Distrib Width: 13.4 %    Platelet Count - Automated: 123 K/uL    Auto Neutrophil #: 6.41 K/uL    Auto Lymphocyte #: 2.00 K/uL    Auto Monocyte #: 0.75 K/uL    Auto Eosinophil #: 0.07 K/uL    Auto Basophil #: 0.03 K/uL    Auto Neutrophil %: 68.5: Differential percentages must be correlated with absolute numbers for  clinical significance. %    Auto Lymphocyte %: 21.3 %    Auto Monocyte %: 8.0 %    Auto Eosinophil %: 0.7 %    Auto Basophil %: 0.3 %    Auto Immature Granulocyte %: 1.2 %

## 2020-11-04 NOTE — PROGRESS NOTE ADULT - ASSESSMENT
30y F, now , postpartum day 2 after  at 40w, IOL for NRFHT.    - Pt progressing well with uncomplicated postpartum course  - Advance care as tolerated  - Continue postpartum education  - Baby boy Vidya+, receiving phototherapy  - Likely discharge home today 30y F, now , postpartum day 2 after  at 40w, IOL for NRFHT. Postpartum Labs WNL. VSS.    - Pt progressing well with uncomplicated postpartum course  - Advance care as tolerated  - Continue postpartum education  - Baby boy Vidya+, receiving phototherapy  - Offered Circumcision, Declined   - Likely discharge home today

## 2020-11-04 NOTE — PROGRESS NOTE ADULT - SUBJECTIVE AND OBJECTIVE BOX
S: Patient doing well. Minimal lochia. No complaints.     O: Vital Signs Last 24 Hrs  T(C): 36.6 (2020 04:12), Max: 36.6 (2020 15:28)  T(F): 97.9 (2020 04:12), Max: 97.9 (2020 15:28)  HR: 75 (2020 04:12) (74 - 75)  BP: 110/77 (2020 04:12) (110/67 - 110/77)  BP(mean): --  RR: 18 (2020 04:12) (18 - 18)  SpO2: 97% (2020 04:12) (97% - 100%)    Gen: NAD  Breast :Breast feeding  Abd: soft, NT, ND, fundus firm below umbilicus  Lochia mild, voiding well  Ext: no tenderness    Labs:           PP # 2 s/p     Doing well.  Discharge home.  Nothing in vagina and no heavy lifting for 6 weeks.   Follow up 6 weeks for post partum visit.  Call office for any fevers, chills, heavy vaginal bleeding, symptoms of depression, or any other concerning symptoms.  Continue motrin 600 mg every 6 hours.

## 2021-07-13 NOTE — OB PROVIDER H&P - LANGUAGE ASSISTANCE NEEDED
Pt presents to ED with complaint of PE. Pt reports had outpatient CT as a pre-op testing for upcoming surgery.  Reports called and told she had a PE and to come to ED
Yes-Patient/Caregiver accepts free interpretation services...

## 2021-09-13 ENCOUNTER — APPOINTMENT (OUTPATIENT)
Dept: ANTEPARTUM | Facility: CLINIC | Age: 31
End: 2021-09-13

## 2021-10-01 NOTE — DISCHARGE NOTE OB - NS AS DC FU INST LIST INST
no Tight glycemic control necessary. Patient will be on IV insulin postoperatively.   Will continue monitoring FS and FU; Will transition to basal bolus insulin regimen once blood sugars are stable and patient is eating meals.

## 2021-10-13 ENCOUNTER — APPOINTMENT (OUTPATIENT)
Dept: ANTEPARTUM | Facility: CLINIC | Age: 31
End: 2021-10-13

## 2022-10-19 ENCOUNTER — EMERGENCY (EMERGENCY)
Facility: HOSPITAL | Age: 32
LOS: 1 days | Discharge: HOME | End: 2022-10-19
Attending: EMERGENCY MEDICINE
Payer: MEDICAID

## 2022-10-19 VITALS
SYSTOLIC BLOOD PRESSURE: 142 MMHG | WEIGHT: 286.6 LBS | RESPIRATION RATE: 18 BRPM | OXYGEN SATURATION: 96 % | DIASTOLIC BLOOD PRESSURE: 80 MMHG | HEIGHT: 65 IN | HEART RATE: 104 BPM | TEMPERATURE: 98 F

## 2022-10-19 VITALS
DIASTOLIC BLOOD PRESSURE: 71 MMHG | SYSTOLIC BLOOD PRESSURE: 135 MMHG | HEART RATE: 98 BPM | RESPIRATION RATE: 17 BRPM | OXYGEN SATURATION: 98 %

## 2022-10-19 DIAGNOSIS — Z90.89 ACQUIRED ABSENCE OF OTHER ORGANS: Chronic | ICD-10-CM

## 2022-10-19 PROCEDURE — 90471 IMMUNIZATION ADMIN: CPT

## 2022-10-19 PROCEDURE — 99284 EMERGENCY DEPT VISIT MOD MDM: CPT | Mod: 25

## 2022-10-19 PROCEDURE — 70486 CT MAXILLOFACIAL W/O DYE: CPT | Mod: MA

## 2022-10-19 PROCEDURE — 70486 CT MAXILLOFACIAL W/O DYE: CPT | Mod: 26,MA

## 2022-10-19 PROCEDURE — 90715 TDAP VACCINE 7 YRS/> IM: CPT

## 2022-10-19 PROCEDURE — 99284 EMERGENCY DEPT VISIT MOD MDM: CPT

## 2022-10-19 PROCEDURE — 70450 CT HEAD/BRAIN W/O DYE: CPT | Mod: MA

## 2022-10-19 PROCEDURE — 70450 CT HEAD/BRAIN W/O DYE: CPT | Mod: 26,MA

## 2022-10-19 RX ORDER — TETANUS TOXOID, REDUCED DIPHTHERIA TOXOID AND ACELLULAR PERTUSSIS VACCINE, ADSORBED 5; 2.5; 8; 8; 2.5 [IU]/.5ML; [IU]/.5ML; UG/.5ML; UG/.5ML; UG/.5ML
0.5 SUSPENSION INTRAMUSCULAR ONCE
Refills: 0 | Status: COMPLETED | OUTPATIENT
Start: 2022-10-19 | End: 2022-10-19

## 2022-10-19 RX ORDER — ACETAMINOPHEN 500 MG
650 TABLET ORAL ONCE
Refills: 0 | Status: COMPLETED | OUTPATIENT
Start: 2022-10-19 | End: 2022-10-19

## 2022-10-19 RX ADMIN — Medication 650 MILLIGRAM(S): at 23:48

## 2022-10-19 RX ADMIN — Medication 650 MILLIGRAM(S): at 22:53

## 2022-10-19 RX ADMIN — TETANUS TOXOID, REDUCED DIPHTHERIA TOXOID AND ACELLULAR PERTUSSIS VACCINE, ADSORBED 0.5 MILLILITER(S): 5; 2.5; 8; 8; 2.5 SUSPENSION INTRAMUSCULAR at 23:44

## 2022-10-19 NOTE — ED PROVIDER NOTE - OBJECTIVE STATEMENT
32F who tripped and fell at [facility] c/o facial pain. Denies blood thinners, loc. +headache, nasal pain. 32F who tripped and fell at [facility] c/o pain to nose and surrounding the right cheek. Denies blood thinners, loc, dizziness, cp, sob, palpitations. +headache, nasal pain.

## 2022-10-19 NOTE — ED PROVIDER NOTE - ATTENDING APP SHARED VISIT CONTRIBUTION OF CARE
The patient seen and examiend    Nasal Bone Fracture    I, Ben Morgan, performed the initial face to face bedside interview with this patient regarding history of present illness, review of symptoms and relevant past medical, social and family history.  I completed an independent physical examination.  I was the initial provider who evaluated this patient. I have signed out the follow up of any pending tests (i.e. labs, radiological studies) to the ACP.  I have communicated the patient’s plan of care and disposition with the ACP.

## 2022-10-19 NOTE — ED PROVIDER NOTE - CLINICAL SUMMARY MEDICAL DECISION MAKING FREE TEXT BOX
CTH and max face. CTH and max face. Nasal bone fx.   TDAP updates. Augmentin x 7 days. To FU with plastic surgery. Discussed ED return precautions. CTH and max face. Nasal bone fx. no septal hematoma.  epistaxis resolved with cold compress and pressure.   TDAP updates. Augmentin x 7 days. To FU with plastic surgery. Discussed ED return precautions.

## 2022-10-19 NOTE — ED PROVIDER NOTE - PATIENT PORTAL LINK FT
You can access the FollowMyHealth Patient Portal offered by [facility] by registering at the following website: [email or web address]. By joining [employer]’s FollowMyHealth portal, you will also be able to view your health information using other applications (apps) compatible with our system.

## 2022-10-19 NOTE — ED PROVIDER NOTE - CARE PROVIDER_API CALL
Itzel Conrad)  Plastic Surgery  [address] [place] [address]  Phone: [phone]  Fax: [phone]  Follow Up Time:

## 2022-10-19 NOTE — ED ADULT NURSE NOTE - OBJECTIVE STATEMENT
patient is a 31 y/o/f presents s/p fall while walking into [employer]. pt denies dizziness, headache, cp, sob or any symptoms prior to fall. pt with bleeding to nose pta, controlled on arrival. swelling noted to nose.

## 2022-10-19 NOTE — ED PROVIDER NOTE - NS ED ATTENDING STATEMENT MOD
This was a shared visit with the PEDRO. I reviewed and verified the documentation and independently performed the documented:

## 2022-10-19 NOTE — ED PROVIDER NOTE - NSFOLLOWUPINSTRUCTIONS_ED_ALL_ED_FT
Follow up with plastic surgery for nasal fractures.  Return to ED for any worsening symptoms.     Seguimiento con cirugía plástica para fracturas nasales.  Regrese al servicio de urgencias por cualquier empeoramiento de los síntomas.

## 2023-09-20 NOTE — ED PROVIDER NOTE - CARE PROVIDERS DIRECT ADDRESSES
Thank you for the referral! It was a pleasure seeing this patient. Please see my note for details.  ,DirectAddress_Unknown

## 2024-01-01 NOTE — OB RN PATIENT PROFILE - HARM RISK FACTORS
CCHD Screen [10-03]: Initial  Pre-Ductal SpO2(%): 100  Post-Ductal SpO2(%): 99  SpO2 Difference(Pre MINUS Post): 1  Extremities Used: Right Hand, Left Foot  Result: Passed  Follow up: Normal Screen- (No follow-up needed)     no

## 2024-01-30 ENCOUNTER — EMERGENCY (EMERGENCY)
Facility: HOSPITAL | Age: 34
LOS: 1 days | Discharge: HOME | End: 2024-01-30
Attending: STUDENT IN AN ORGANIZED HEALTH CARE EDUCATION/TRAINING PROGRAM
Payer: MEDICAID

## 2024-01-30 VITALS
SYSTOLIC BLOOD PRESSURE: 143 MMHG | RESPIRATION RATE: 16 BRPM | TEMPERATURE: 98 F | DIASTOLIC BLOOD PRESSURE: 84 MMHG | HEIGHT: 66 IN | HEART RATE: 91 BPM | OXYGEN SATURATION: 99 % | WEIGHT: 246.04 LBS

## 2024-01-30 DIAGNOSIS — Z90.89 ACQUIRED ABSENCE OF OTHER ORGANS: Chronic | ICD-10-CM

## 2024-01-30 PROBLEM — O03.9 COMPLETE OR UNSPECIFIED SPONTANEOUS ABORTION WITHOUT COMPLICATION: Chronic | Status: ACTIVE | Noted: 2020-10-22

## 2024-01-30 PROCEDURE — 99284 EMERGENCY DEPT VISIT MOD MDM: CPT

## 2024-01-30 PROCEDURE — 71046 X-RAY EXAM CHEST 2 VIEWS: CPT

## 2024-01-30 PROCEDURE — 93010 ELECTROCARDIOGRAM REPORT: CPT

## 2024-01-30 PROCEDURE — 71046 X-RAY EXAM CHEST 2 VIEWS: CPT | Mod: 26

## 2024-01-30 PROCEDURE — 99283 EMERGENCY DEPT VISIT LOW MDM: CPT | Mod: 25

## 2024-01-30 PROCEDURE — 93005 ELECTROCARDIOGRAM TRACING: CPT

## 2024-01-30 PROCEDURE — T1013: CPT

## 2024-01-30 NOTE — ED ADULT NURSE NOTE - CAS ELECT INFOMATION PROVIDED
Patient discharged by provider BRISEIDA Bates prior to RN assessment. No signs of acute distress noted, respirations even and unlabored. Refer to provider notes./DC instructions

## 2024-01-30 NOTE — ED ADULT NURSE NOTE - BIRTH SEX
-- DO NOT REPLY / DO NOT REPLY ALL --  -- Message is from [facility] ([facility]O) --    General Patient Message: ACL has a question in regards to a packing slip they received, wants to know if it is a duplicate because sample was not received Please call back.  Order was created by Eduarda.     Caller Information       Type Contact Phone/Fax    10/27/2023 10:12 AM CDT Phone (Incoming) Christy [phone]     ACL        Alternative phone number: no    Can a detailed message be left? Yes    Message Turnaround:     Is it Working Hours? Yes - Working Hours     [place]:    Please give this turnaround time to the caller:   \"This message will be sent to [state Provider's name]. The clinical team will fulfill your request as soon as they review your message.\"                 Female

## 2024-01-30 NOTE — ED PROVIDER NOTE - PATIENT PORTAL LINK FT
You can access the FollowMyHealth Patient Portal offered by [facility] by registering at the following website: [email or web address]. By joining [email or web address]’s FollowMyHealth portal, you will also be able to view your health information using other applications (apps) compatible with our system.

## 2024-01-30 NOTE — ED ADULT NURSE REASSESSMENT NOTE - NS ED NURSE REASSESS COMMENT FT1
Patient discharged by provider BRISEIDA Bates prior to RN assessment. No signs of acute distress noted, respirations even and unlabored. Refer to provider notes.

## 2024-01-30 NOTE — ED PROVIDER NOTE - NSFOLLOWUPINSTRUCTIONS_ED_ALL_ED_FT
Disnea en los adultos  Shortness of Breath, Adult    Ryan persona tiene disnea cuando no puede inhalar suficiente aire o cuando siente que tiene dificultades para hacerlo. La disnea puede ser un signo de un problema médico.    Siga estas indicaciones en oviedo casa:     Esté atento a cualquier cambio en los síntomas.  No consuma ningún producto que contenga nicotina o tabaco, malik cigarrillos, cigarrillos electrónicos y tabaco de mascar.   No fume. Fumar es ryan causa frecuente de disnea. Si necesita ayuda para dejar de fumar, consulte al médico.  Evite cosas que pueden irritar las vías respiratorias, por ejemplo:    Moho.  Polvo.  Contaminación del aire.  Vapores de productos químicos.  Cosas que causan síntomas de alergia (alérgenos), si tiene alergias.  Mantenga oviedo casa limpia y sin moho ni polvo.  Descanse todo lo que sea necesario. Retome gradualmente yue actividades habituales.  [place] los medicamentos de venta rachel y los recetados solamente malik se lo haya indicado el médico. [place] incluye la oxigenoterapia y los medicamentos inhalados.  Concurra a todas las visitas de seguimiento malik se lo haya indicado el médico. [place] es importante.    Comuníquese con un médico si:  Oviedo afección no mejora tan pronto malik se espera.  Le melanie hacer las actividades cotidianas, incluso después de descansar.  Aparecen nuevos síntomas.    Solicite ayuda inmediatamente si:  La disnea empeora.  Tiene dificultad para respirar cuando está en reposo.  Se siente mareado o se desmaya.  Tiene tos que no puede controlar con la medicación.  Tose y escupe lucinda.  Siente dolor al respirar.  Tiene dolor en el pecho, los brazos, los hombros o el abdomen.  Tiene fiebre.  No puede subir escaleras o realizar ejercicio del modo en que lo hacía habitualmente.    Estos síntomas pueden representar un problema grave que constituye ryan emergencia. No espere a natalia si los síntomas desaparecen. Solicite atención médica de inmediato. Comuníquese con el servicio de emergencias de oviedo localidad (911 en los [place]). No conduzca por yue propios medios hasta el hospital.    Resumen  Ryan persona tiene disnea cuando tiene dificultad para inhalar la cantidad suficiente de aire. Puede ser signo de un problema médico.  Evite las cosas que irritan los pulmones, malik el hábito de fumar, la contaminación, el moho y el polvo.  Preste atención a los cambios en los síntomas y comuníquese con el médico si le resulta difícil realizar yue actividades cotidianas debido a la disnea.    NOTAS ADICIONALES E INSTRUCCIONES    Por favor tenga seguimiento con oviedo doctor primario entre 2 pyle.  Regrese a urgencias por cualquier preocupacion medica.

## 2024-01-30 NOTE — ED PROVIDER NOTE - OBJECTIVE STATEMENT
32yo female with no pmh presents with josé. Pt states for the past few weeks with josé, when she walks a long time she feels sob. PT also states when she has josé she feels some chest pressure no radiating. Pt denies fevers/chills, ha, loc, focal neuro deficits, palp, cough, abd pain/n/v/d, urinary symptoms, recent travel and sick contacts.

## 2024-01-30 NOTE — ED PROVIDER NOTE - WR ORDER NAME 1
Spoke with Dr. Mancilla regarding need for coumadin dose for this evening. 4mg of coumadin ordered.   Xray Chest 2 Views PA/Lat

## 2024-01-30 NOTE — ED PROVIDER NOTE - CLINICAL SUMMARY MEDICAL DECISION MAKING FREE TEXT BOX
32yo female with no pmh presents with josé - pt low risk PE PERC neg, ekg no acute ischemic changes, low risk MACE no risk factors, cxr

## 2025-05-28 ENCOUNTER — EMERGENCY (EMERGENCY)
Facility: HOSPITAL | Age: 35
LOS: 1 days | End: 2025-05-28
Attending: STUDENT IN AN ORGANIZED HEALTH CARE EDUCATION/TRAINING PROGRAM
Payer: MEDICAID

## 2025-05-28 VITALS
HEART RATE: 77 BPM | RESPIRATION RATE: 18 BRPM | SYSTOLIC BLOOD PRESSURE: 119 MMHG | DIASTOLIC BLOOD PRESSURE: 73 MMHG | OXYGEN SATURATION: 98 % | TEMPERATURE: 99 F

## 2025-05-28 VITALS
TEMPERATURE: 98 F | WEIGHT: 249.56 LBS | OXYGEN SATURATION: 99 % | SYSTOLIC BLOOD PRESSURE: 127 MMHG | RESPIRATION RATE: 16 BRPM | DIASTOLIC BLOOD PRESSURE: 81 MMHG | HEART RATE: 87 BPM

## 2025-05-28 DIAGNOSIS — Z90.89 ACQUIRED ABSENCE OF OTHER ORGANS: Chronic | ICD-10-CM

## 2025-05-28 LAB
ALBUMIN SERPL ELPH-MCNC: 3.6 G/DL — SIGNIFICANT CHANGE UP (ref 3.3–5.2)
ALP SERPL-CCNC: 56 U/L — SIGNIFICANT CHANGE UP (ref 40–120)
ALT FLD-CCNC: 26 U/L — SIGNIFICANT CHANGE UP
ANION GAP SERPL CALC-SCNC: 14 MMOL/L — SIGNIFICANT CHANGE UP (ref 5–17)
AST SERPL-CCNC: 29 U/L — SIGNIFICANT CHANGE UP
BASOPHILS # BLD AUTO: 0.02 K/UL — SIGNIFICANT CHANGE UP (ref 0–0.2)
BASOPHILS # BLD MANUAL: 0 K/UL — SIGNIFICANT CHANGE UP (ref 0–0.2)
BASOPHILS NFR BLD AUTO: 0.7 % — SIGNIFICANT CHANGE UP (ref 0–2)
BASOPHILS NFR BLD MANUAL: 0 % — SIGNIFICANT CHANGE UP (ref 0–2)
BILIRUB SERPL-MCNC: <0.2 MG/DL — LOW (ref 0.4–2)
BUN SERPL-MCNC: 8.7 MG/DL — SIGNIFICANT CHANGE UP (ref 8–20)
CALCIUM SERPL-MCNC: 8.2 MG/DL — LOW (ref 8.4–10.5)
CHLORIDE SERPL-SCNC: 99 MMOL/L — SIGNIFICANT CHANGE UP (ref 96–108)
CO2 SERPL-SCNC: 22 MMOL/L — SIGNIFICANT CHANGE UP (ref 22–29)
CREAT SERPL-MCNC: 0.52 MG/DL — SIGNIFICANT CHANGE UP (ref 0.5–1.3)
EGFR: 126 ML/MIN/1.73M2 — SIGNIFICANT CHANGE UP
EGFR: 126 ML/MIN/1.73M2 — SIGNIFICANT CHANGE UP
EOSINOPHIL # BLD AUTO: 0.02 K/UL — SIGNIFICANT CHANGE UP (ref 0–0.5)
EOSINOPHIL # BLD MANUAL: 0 K/UL — SIGNIFICANT CHANGE UP (ref 0–0.5)
EOSINOPHIL NFR BLD AUTO: 0.7 % — SIGNIFICANT CHANGE UP (ref 0–6)
EOSINOPHIL NFR BLD MANUAL: 0 % — SIGNIFICANT CHANGE UP (ref 0–6)
GIANT PLATELETS BLD QL SMEAR: PRESENT
GLUCOSE SERPL-MCNC: 87 MG/DL — SIGNIFICANT CHANGE UP (ref 70–99)
HCG SERPL-ACNC: <4 MIU/ML — SIGNIFICANT CHANGE UP
HCT VFR BLD CALC: 38.8 % — SIGNIFICANT CHANGE UP (ref 34.5–45)
HGB BLD-MCNC: 12.1 G/DL — SIGNIFICANT CHANGE UP (ref 11.5–15.5)
IMM GRANULOCYTES # BLD AUTO: 0.01 K/UL — SIGNIFICANT CHANGE UP (ref 0–0.07)
IMM GRANULOCYTES NFR BLD AUTO: 0.4 % — SIGNIFICANT CHANGE UP (ref 0–0.9)
LYMPHOCYTES # BLD AUTO: 1.36 K/UL — SIGNIFICANT CHANGE UP (ref 1–3.3)
LYMPHOCYTES # BLD MANUAL: 1.03 K/UL — SIGNIFICANT CHANGE UP (ref 1–3.3)
LYMPHOCYTES NFR BLD AUTO: 48.1 % — HIGH (ref 13–44)
LYMPHOCYTES NFR BLD MANUAL: 36.5 % — SIGNIFICANT CHANGE UP (ref 13–44)
MAGNESIUM SERPL-MCNC: 2 MG/DL — SIGNIFICANT CHANGE UP (ref 1.6–2.6)
MANUAL NEUTROPHIL BANDS #: 0.15 K/UL — SIGNIFICANT CHANGE UP (ref 0–0.84)
MANUAL REACTIVE LYMPHOCYTES #: 0.25 K/UL — SIGNIFICANT CHANGE UP (ref 0–0.63)
MCHC RBC-ENTMCNC: 26.8 PG — LOW (ref 27–34)
MCHC RBC-ENTMCNC: 31.2 G/DL — LOW (ref 32–36)
MCV RBC AUTO: 85.8 FL — SIGNIFICANT CHANGE UP (ref 80–100)
MONOCYTES # BLD AUTO: 0.53 K/UL — SIGNIFICANT CHANGE UP (ref 0–0.9)
MONOCYTES # BLD MANUAL: 0.35 K/UL — SIGNIFICANT CHANGE UP (ref 0–0.9)
MONOCYTES NFR BLD AUTO: 18.7 % — HIGH (ref 2–14)
MONOCYTES NFR BLD MANUAL: 12.2 % — SIGNIFICANT CHANGE UP (ref 2–14)
NEUTROPHILS # BLD AUTO: 0.89 K/UL — LOW (ref 1.8–7.4)
NEUTROPHILS # BLD MANUAL: 1.06 K/UL — LOW (ref 1.8–7.4)
NEUTROPHILS NFR BLD AUTO: 31.4 % — LOW (ref 43–77)
NEUTROPHILS NFR BLD MANUAL: 37.4 % — LOW (ref 43–77)
NEUTS BAND # BLD: 5.2 % — SIGNIFICANT CHANGE UP (ref 0–8)
NEUTS BAND NFR BLD: 5.2 % — SIGNIFICANT CHANGE UP (ref 0–8)
NRBC # BLD AUTO: 0 K/UL — SIGNIFICANT CHANGE UP (ref 0–0)
NRBC # FLD: 0 K/UL — SIGNIFICANT CHANGE UP (ref 0–0)
NRBC BLD AUTO-RTO: 0 /100 WBCS — SIGNIFICANT CHANGE UP (ref 0–0)
PLAT MORPH BLD: NORMAL — SIGNIFICANT CHANGE UP
PLATELET # BLD AUTO: 179 K/UL — SIGNIFICANT CHANGE UP (ref 150–400)
PMV BLD: 11.2 FL — SIGNIFICANT CHANGE UP (ref 7–13)
POTASSIUM SERPL-MCNC: 4.3 MMOL/L — SIGNIFICANT CHANGE UP (ref 3.5–5.3)
POTASSIUM SERPL-SCNC: 4.3 MMOL/L — SIGNIFICANT CHANGE UP (ref 3.5–5.3)
PROT SERPL-MCNC: 6.7 G/DL — SIGNIFICANT CHANGE UP (ref 6.6–8.7)
RBC # BLD: 4.52 M/UL — SIGNIFICANT CHANGE UP (ref 3.8–5.2)
RBC # FLD: 13.2 % — SIGNIFICANT CHANGE UP (ref 10.3–14.5)
RBC BLD AUTO: NORMAL — SIGNIFICANT CHANGE UP
SODIUM SERPL-SCNC: 135 MMOL/L — SIGNIFICANT CHANGE UP (ref 135–145)
VARIANT LYMPHS # BLD: 8.7 % — HIGH (ref 0–6)
VARIANT LYMPHS NFR BLD MANUAL: 8.7 % — HIGH (ref 0–6)
WBC # BLD: 2.83 K/UL — LOW (ref 3.8–10.5)
WBC # FLD AUTO: 2.83 K/UL — LOW (ref 3.8–10.5)

## 2025-05-28 PROCEDURE — 99284 EMERGENCY DEPT VISIT MOD MDM: CPT | Mod: 25

## 2025-05-28 PROCEDURE — 73502 X-RAY EXAM HIP UNI 2-3 VIEWS: CPT | Mod: 26,RT

## 2025-05-28 PROCEDURE — 96375 TX/PRO/DX INJ NEW DRUG ADDON: CPT

## 2025-05-28 PROCEDURE — 73502 X-RAY EXAM HIP UNI 2-3 VIEWS: CPT

## 2025-05-28 PROCEDURE — 96374 THER/PROPH/DIAG INJ IV PUSH: CPT

## 2025-05-28 PROCEDURE — 83735 ASSAY OF MAGNESIUM: CPT

## 2025-05-28 PROCEDURE — 80053 COMPREHEN METABOLIC PANEL: CPT

## 2025-05-28 PROCEDURE — 36415 COLL VENOUS BLD VENIPUNCTURE: CPT

## 2025-05-28 PROCEDURE — T1013: CPT

## 2025-05-28 PROCEDURE — 99284 EMERGENCY DEPT VISIT MOD MDM: CPT

## 2025-05-28 PROCEDURE — 84702 CHORIONIC GONADOTROPIN TEST: CPT

## 2025-05-28 PROCEDURE — 85025 COMPLETE CBC W/AUTO DIFF WBC: CPT

## 2025-05-28 RX ORDER — KETOROLAC TROMETHAMINE 30 MG/ML
15 INJECTION, SOLUTION INTRAMUSCULAR; INTRAVENOUS ONCE
Refills: 0 | Status: DISCONTINUED | OUTPATIENT
Start: 2025-05-28 | End: 2025-05-28

## 2025-05-28 RX ORDER — METOCLOPRAMIDE HCL 10 MG
10 TABLET ORAL ONCE
Refills: 0 | Status: COMPLETED | OUTPATIENT
Start: 2025-05-28 | End: 2025-05-28

## 2025-05-28 RX ADMIN — KETOROLAC TROMETHAMINE 15 MILLIGRAM(S): 30 INJECTION, SOLUTION INTRAMUSCULAR; INTRAVENOUS at 12:08

## 2025-05-28 RX ADMIN — Medication 1000 MILLILITER(S): at 12:08

## 2025-05-28 RX ADMIN — Medication 10 MILLIGRAM(S): at 12:08

## 2025-05-28 NOTE — ED PROVIDER NOTE - PATIENT PORTAL LINK FT
You can access the FollowMyHealth Patient Portal offered by [facility] by registering at the following website: [email or web address]. By joining [employer]’s FollowMyHealth portal, you will also be able to view your health information using other applications (apps) compatible with our system.

## 2025-05-28 NOTE — ED PROVIDER NOTE - OBJECTIVE STATEMENT
35-year-old female presenting to the ED complaining of gradual onset headache with right sided groin pain x 4 days.  Patient states that she often gets headaches but this headache feels a little worse than her normal. Pt otherwise denies fever/chills, c/p, sob, abd pain, n/v/c/d, dysuria, numbness/tingling/weakness and has no other complaints at this time. Pt otherwise denies trauma, fever/chills, c/p, sob, abd pain, n/v/c/d, dysuria, numbness/tingling/weakness and has no other complaints at this time.

## 2025-05-28 NOTE — ED PROVIDER NOTE - ATTENDING APP SHARED VISIT CONTRIBUTION OF CARE
35 YOF with 4 days of HA and right sided leg pain. H/o of migraine. No focal symptoms. No trauma. Denies fever, cough, n/v, cp, sob, abd pain, numbness, tingling.   Ap - nontoxic appearing. treat supportive

## 2025-05-28 NOTE — ED PROVIDER NOTE - PROVIDER TOKENS
PROVIDER:[TOKEN:[[id number]],FOLLOWUP:[4-6 Days]] PROVIDER:[TOKEN:[[id number]],FOLLOWUP:[7-10 Days]]

## 2025-05-28 NOTE — ED PROVIDER NOTE - CLINICAL SUMMARY MEDICAL DECISION MAKING FREE TEXT BOX
35-year-old female presenting to the ED complaining of gradual onset headache with right sided groin pain x 4 days. Labs reviewed–mildly low wbc count.  Patient made aware and will follow-up with her PCP for repeat blood work.  Patient reports complete relief presenting symptoms.  X-rays reviewed–no acute pathology.  Patient stable for discharge outpatient neurology follow-up as needed.  Return precautions discussed patient.

## 2025-05-28 NOTE — ED PROVIDER NOTE - CARE PROVIDERS DIRECT ADDRESSES
,DirectAddress_Unknown ,[email or web address]

## 2025-05-28 NOTE — ED PROVIDER NOTE - MUSCULOSKELETAL, MLM
Spine appears normal, range of motion is not limited. FROM of all extremities. mild ttp of the right hip/groin. SILT. distal pulses intact/equal.